# Patient Record
Sex: FEMALE | Race: WHITE | NOT HISPANIC OR LATINO | Employment: OTHER | URBAN - METROPOLITAN AREA
[De-identification: names, ages, dates, MRNs, and addresses within clinical notes are randomized per-mention and may not be internally consistent; named-entity substitution may affect disease eponyms.]

---

## 2019-09-30 ENCOUNTER — HOSPITAL ENCOUNTER (OUTPATIENT)
Dept: BONE DENSITY | Facility: CLINIC | Age: 74
Discharge: HOME | End: 2019-09-30
Attending: FAMILY MEDICINE | Admitting: FAMILY MEDICINE
Payer: COMMERCIAL

## 2019-09-30 DIAGNOSIS — M85.80 OSTEOPENIA: ICD-10-CM

## 2019-09-30 PROCEDURE — 77080 DXA BONE DENSITY AXIAL: CPT

## 2020-10-09 ENCOUNTER — ANESTHESIA (OUTPATIENT)
Dept: SURGERY | Facility: CLINIC | Age: 75
DRG: 342 | End: 2020-10-09
Payer: COMMERCIAL

## 2020-10-09 ENCOUNTER — ANESTHESIA EVENT (OUTPATIENT)
Dept: SURGERY | Facility: CLINIC | Age: 75
DRG: 342 | End: 2020-10-09
Payer: COMMERCIAL

## 2020-10-09 ENCOUNTER — SURGERY (OUTPATIENT)
Age: 75
End: 2020-10-09
Payer: COMMERCIAL

## 2020-10-09 ENCOUNTER — APPOINTMENT (OUTPATIENT)
Dept: CT IMAGING | Facility: CLINIC | Age: 75
DRG: 342 | End: 2020-10-09
Attending: PHYSICIAN ASSISTANT
Payer: COMMERCIAL

## 2020-10-09 ENCOUNTER — HOSPITAL ENCOUNTER (INPATIENT)
Facility: CLINIC | Age: 75
LOS: 2 days | Discharge: HOME | DRG: 342 | End: 2020-10-14
Attending: PHYSICIAN ASSISTANT | Admitting: SURGERY
Payer: COMMERCIAL

## 2020-10-09 DIAGNOSIS — G89.18 ACUTE POST-OPERATIVE PAIN: Primary | ICD-10-CM

## 2020-10-09 DIAGNOSIS — R91.8 PULMONARY NODULES: ICD-10-CM

## 2020-10-09 DIAGNOSIS — K35.201 ACUTE APPENDICITIS WITH PERFORATION, GENERALIZED PERITONITIS, AND GANGRENE, WITHOUT ABSCESS: ICD-10-CM

## 2020-10-09 DIAGNOSIS — K80.20 CHOLELITHIASIS: ICD-10-CM

## 2020-10-09 DIAGNOSIS — K35.30 ACUTE APPENDICITIS WITH LOCALIZED PERITONITIS: ICD-10-CM

## 2020-10-09 PROBLEM — K35.209 ACUTE APPENDICITIS WITH GENERALIZED PERITONITIS: Status: ACTIVE | Noted: 2020-10-09

## 2020-10-09 LAB
ALBUMIN SERPL-MCNC: 3.9 G/DL (ref 3.4–5)
ALP SERPL-CCNC: 86 U/L (ref 40–150)
ALT SERPL W P-5'-P-CCNC: 25 U/L (ref 0–50)
ANION GAP SERPL CALCULATED.3IONS-SCNC: 5 MMOL/L (ref 3–14)
AST SERPL W P-5'-P-CCNC: 17 U/L (ref 0–45)
BASOPHILS # BLD AUTO: 0 10E9/L (ref 0–0.2)
BASOPHILS NFR BLD AUTO: 0.2 %
BILIRUB SERPL-MCNC: 0.8 MG/DL (ref 0.2–1.3)
BUN SERPL-MCNC: 17 MG/DL (ref 7–30)
CALCIUM SERPL-MCNC: 9 MG/DL (ref 8.5–10.1)
CHLORIDE SERPL-SCNC: 104 MMOL/L (ref 94–109)
CO2 SERPL-SCNC: 26 MMOL/L (ref 20–32)
CREAT BLD-MCNC: 0.7 MG/DL (ref 0.52–1.04)
CREAT SERPL-MCNC: 0.7 MG/DL (ref 0.52–1.04)
DIFFERENTIAL METHOD BLD: ABNORMAL
EOSINOPHIL # BLD AUTO: 0 10E9/L (ref 0–0.7)
EOSINOPHIL NFR BLD AUTO: 0.2 %
ERYTHROCYTE [DISTWIDTH] IN BLOOD BY AUTOMATED COUNT: 12.8 % (ref 10–15)
GFR SERPL CREATININE-BSD FRML MDRD: 82 ML/MIN/{1.73_M2}
GFR SERPL CREATININE-BSD FRML MDRD: 85 ML/MIN/{1.73_M2}
GLUCOSE SERPL-MCNC: 144 MG/DL (ref 70–99)
HCT VFR BLD AUTO: 44.5 % (ref 35–47)
HGB BLD-MCNC: 15.2 G/DL (ref 11.7–15.7)
IMM GRANULOCYTES # BLD: 0 10E9/L (ref 0–0.4)
IMM GRANULOCYTES NFR BLD: 0.2 %
LABORATORY COMMENT REPORT: NORMAL
LYMPHOCYTES # BLD AUTO: 1.2 10E9/L (ref 0.8–5.3)
LYMPHOCYTES NFR BLD AUTO: 9.1 %
MCH RBC QN AUTO: 30.8 PG (ref 26.5–33)
MCHC RBC AUTO-ENTMCNC: 34.2 G/DL (ref 31.5–36.5)
MCV RBC AUTO: 90 FL (ref 78–100)
MONOCYTES # BLD AUTO: 0.8 10E9/L (ref 0–1.3)
MONOCYTES NFR BLD AUTO: 5.8 %
NEUTROPHILS # BLD AUTO: 11.3 10E9/L (ref 1.6–8.3)
NEUTROPHILS NFR BLD AUTO: 84.5 %
NRBC # BLD AUTO: 0 10*3/UL
NRBC BLD AUTO-RTO: 0 /100
PLATELET # BLD AUTO: 246 10E9/L (ref 150–450)
POTASSIUM SERPL-SCNC: 3.9 MMOL/L (ref 3.4–5.3)
PROT SERPL-MCNC: 7.5 G/DL (ref 6.8–8.8)
RADIOLOGIST FLAGS: ABNORMAL
RBC # BLD AUTO: 4.94 10E12/L (ref 3.8–5.2)
SARS-COV-2 RNA SPEC QL NAA+PROBE: NEGATIVE
SARS-COV-2 RNA SPEC QL NAA+PROBE: NORMAL
SODIUM SERPL-SCNC: 135 MMOL/L (ref 133–144)
SPECIMEN SOURCE: NORMAL
SPECIMEN SOURCE: NORMAL
WBC # BLD AUTO: 13.4 10E9/L (ref 4–11)

## 2020-10-09 PROCEDURE — 250N000011 HC RX IP 250 OP 636: Performed by: NURSE ANESTHETIST, CERTIFIED REGISTERED

## 2020-10-09 PROCEDURE — 999N000139 HC STATISTIC PRE-PROCEDURE ASSESSMENT II: Performed by: SURGERY

## 2020-10-09 PROCEDURE — 761N000002 HC RECOVERY PHASE 1 LEVEL 1 EA ADDTL HR: Performed by: SURGERY

## 2020-10-09 PROCEDURE — 761N000001 HC RECOVERY PHASE 1 LEVEL 1 FIRST HR: Performed by: SURGERY

## 2020-10-09 PROCEDURE — 44970 LAPAROSCOPY APPENDECTOMY: CPT | Performed by: SURGERY

## 2020-10-09 PROCEDURE — 99222 1ST HOSP IP/OBS MODERATE 55: CPT | Mod: 57 | Performed by: SURGERY

## 2020-10-09 PROCEDURE — 258N000003 HC RX IP 258 OP 636: Performed by: NURSE ANESTHETIST, CERTIFIED REGISTERED

## 2020-10-09 PROCEDURE — U0003 INFECTIOUS AGENT DETECTION BY NUCLEIC ACID (DNA OR RNA); SEVERE ACUTE RESPIRATORY SYNDROME CORONAVIRUS 2 (SARS-COV-2) (CORONAVIRUS DISEASE [COVID-19]), AMPLIFIED PROBE TECHNIQUE, MAKING USE OF HIGH THROUGHPUT TECHNOLOGIES AS DESCRIBED BY CMS-2020-01-R: HCPCS | Performed by: PHYSICIAN ASSISTANT

## 2020-10-09 PROCEDURE — 0DTJ4ZZ RESECTION OF APPENDIX, PERCUTANEOUS ENDOSCOPIC APPROACH: ICD-10-PCS | Performed by: SURGERY

## 2020-10-09 PROCEDURE — 85025 COMPLETE CBC W/AUTO DIFF WBC: CPT | Performed by: PHYSICIAN ASSISTANT

## 2020-10-09 PROCEDURE — 370N000001 HC ANESTHESIA TECHNICAL FEE, 1ST 30 MIN: Performed by: SURGERY

## 2020-10-09 PROCEDURE — 250N000009 HC RX 250: Performed by: NURSE ANESTHETIST, CERTIFIED REGISTERED

## 2020-10-09 PROCEDURE — G0378 HOSPITAL OBSERVATION PER HR: HCPCS

## 2020-10-09 PROCEDURE — 250N000013 HC RX MED GY IP 250 OP 250 PS 637: Performed by: PHYSICIAN ASSISTANT

## 2020-10-09 PROCEDURE — 360N000021 HC SURGERY LEVEL 3 EA 15 ADDTL MIN: Performed by: SURGERY

## 2020-10-09 PROCEDURE — 96376 TX/PRO/DX INJ SAME DRUG ADON: CPT

## 2020-10-09 PROCEDURE — 82565 ASSAY OF CREATININE: CPT

## 2020-10-09 PROCEDURE — 250N000011 HC RX IP 250 OP 636: Performed by: PHYSICIAN ASSISTANT

## 2020-10-09 PROCEDURE — 74177 CT ABD & PELVIS W/CONTRAST: CPT

## 2020-10-09 PROCEDURE — 88304 TISSUE EXAM BY PATHOLOGIST: CPT | Mod: TC | Performed by: SURGERY

## 2020-10-09 PROCEDURE — 80053 COMPREHEN METABOLIC PANEL: CPT | Performed by: PHYSICIAN ASSISTANT

## 2020-10-09 PROCEDURE — 99285 EMERGENCY DEPT VISIT HI MDM: CPT | Mod: 25

## 2020-10-09 PROCEDURE — 96375 TX/PRO/DX INJ NEW DRUG ADDON: CPT

## 2020-10-09 PROCEDURE — 44970 LAPAROSCOPY APPENDECTOMY: CPT | Mod: AS | Performed by: PHYSICIAN ASSISTANT

## 2020-10-09 PROCEDURE — 250N000011 HC RX IP 250 OP 636

## 2020-10-09 PROCEDURE — C9803 HOPD COVID-19 SPEC COLLECT: HCPCS

## 2020-10-09 PROCEDURE — 360N000020 HC SURGERY LEVEL 3 1ST 30 MIN: Performed by: SURGERY

## 2020-10-09 PROCEDURE — 250N000009 HC RX 250: Performed by: SURGERY

## 2020-10-09 PROCEDURE — 96361 HYDRATE IV INFUSION ADD-ON: CPT

## 2020-10-09 PROCEDURE — 96365 THER/PROPH/DIAG IV INF INIT: CPT

## 2020-10-09 PROCEDURE — 272N000001 HC OR GENERAL SUPPLY STERILE: Performed by: SURGERY

## 2020-10-09 PROCEDURE — 250N000009 HC RX 250: Performed by: PHYSICIAN ASSISTANT

## 2020-10-09 PROCEDURE — 250N000003 HC SEVOFLURANE, EA 15 MIN: Performed by: SURGERY

## 2020-10-09 PROCEDURE — 88304 TISSUE EXAM BY PATHOLOGIST: CPT | Mod: 26 | Performed by: PATHOLOGY

## 2020-10-09 PROCEDURE — 258N000003 HC RX IP 258 OP 636: Performed by: PHYSICIAN ASSISTANT

## 2020-10-09 PROCEDURE — 370N000002 HC ANESTHESIA TECHNICAL FEE, EACH ADDTL 15 MIN: Performed by: SURGERY

## 2020-10-09 RX ORDER — FENTANYL CITRATE 50 UG/ML
INJECTION, SOLUTION INTRAMUSCULAR; INTRAVENOUS PRN
Status: DISCONTINUED | OUTPATIENT
Start: 2020-10-09 | End: 2020-10-09

## 2020-10-09 RX ORDER — ONDANSETRON 2 MG/ML
4 INJECTION INTRAMUSCULAR; INTRAVENOUS ONCE
Status: COMPLETED | OUTPATIENT
Start: 2020-10-09 | End: 2020-10-09

## 2020-10-09 RX ORDER — HYDROMORPHONE HYDROCHLORIDE 1 MG/ML
.3-.5 INJECTION, SOLUTION INTRAMUSCULAR; INTRAVENOUS; SUBCUTANEOUS EVERY 5 MIN PRN
Status: DISCONTINUED | OUTPATIENT
Start: 2020-10-09 | End: 2020-10-09

## 2020-10-09 RX ORDER — ALBUTEROL SULFATE 0.83 MG/ML
2.5 SOLUTION RESPIRATORY (INHALATION) EVERY 4 HOURS PRN
Status: DISCONTINUED | OUTPATIENT
Start: 2020-10-09 | End: 2020-10-09

## 2020-10-09 RX ORDER — ONDANSETRON 4 MG/1
4 TABLET, ORALLY DISINTEGRATING ORAL EVERY 30 MIN PRN
Status: DISCONTINUED | OUTPATIENT
Start: 2020-10-09 | End: 2020-10-09

## 2020-10-09 RX ORDER — HYDROMORPHONE HYDROCHLORIDE 1 MG/ML
0.5 INJECTION, SOLUTION INTRAMUSCULAR; INTRAVENOUS; SUBCUTANEOUS ONCE
Status: COMPLETED | OUTPATIENT
Start: 2020-10-09 | End: 2020-10-09

## 2020-10-09 RX ORDER — CEFOTETAN DISODIUM 2 G/20ML
2 INJECTION, POWDER, FOR SOLUTION INTRAMUSCULAR; INTRAVENOUS ONCE
Status: COMPLETED | OUTPATIENT
Start: 2020-10-09 | End: 2020-10-09

## 2020-10-09 RX ORDER — BUPIVACAINE HYDROCHLORIDE AND EPINEPHRINE 5; 5 MG/ML; UG/ML
INJECTION, SOLUTION PERINEURAL PRN
Status: DISCONTINUED | OUTPATIENT
Start: 2020-10-09 | End: 2020-10-09 | Stop reason: HOSPADM

## 2020-10-09 RX ORDER — SODIUM CHLORIDE, SODIUM LACTATE, POTASSIUM CHLORIDE, CALCIUM CHLORIDE 600; 310; 30; 20 MG/100ML; MG/100ML; MG/100ML; MG/100ML
INJECTION, SOLUTION INTRAVENOUS CONTINUOUS PRN
Status: DISCONTINUED | OUTPATIENT
Start: 2020-10-09 | End: 2020-10-09

## 2020-10-09 RX ORDER — FENTANYL CITRATE 50 UG/ML
25-50 INJECTION, SOLUTION INTRAMUSCULAR; INTRAVENOUS EVERY 5 MIN PRN
Status: DISCONTINUED | OUTPATIENT
Start: 2020-10-09 | End: 2020-10-09

## 2020-10-09 RX ORDER — ONDANSETRON 2 MG/ML
INJECTION INTRAMUSCULAR; INTRAVENOUS
Status: COMPLETED
Start: 2020-10-09 | End: 2020-10-09

## 2020-10-09 RX ORDER — ONDANSETRON 2 MG/ML
INJECTION INTRAMUSCULAR; INTRAVENOUS PRN
Status: DISCONTINUED | OUTPATIENT
Start: 2020-10-09 | End: 2020-10-09

## 2020-10-09 RX ORDER — GLYCOPYRROLATE 0.2 MG/ML
INJECTION, SOLUTION INTRAMUSCULAR; INTRAVENOUS PRN
Status: DISCONTINUED | OUTPATIENT
Start: 2020-10-09 | End: 2020-10-09

## 2020-10-09 RX ORDER — DEXTROSE MONOHYDRATE, SODIUM CHLORIDE, AND POTASSIUM CHLORIDE 50; 1.49; 4.5 G/1000ML; G/1000ML; G/1000ML
INJECTION, SOLUTION INTRAVENOUS CONTINUOUS
Status: DISCONTINUED | OUTPATIENT
Start: 2020-10-09 | End: 2020-10-11

## 2020-10-09 RX ORDER — DEXAMETHASONE SODIUM PHOSPHATE 4 MG/ML
INJECTION, SOLUTION INTRA-ARTICULAR; INTRALESIONAL; INTRAMUSCULAR; INTRAVENOUS; SOFT TISSUE PRN
Status: DISCONTINUED | OUTPATIENT
Start: 2020-10-09 | End: 2020-10-09

## 2020-10-09 RX ORDER — PIPERACILLIN SODIUM, TAZOBACTAM SODIUM 3; .375 G/15ML; G/15ML
3.38 INJECTION, POWDER, LYOPHILIZED, FOR SOLUTION INTRAVENOUS EVERY 6 HOURS
Status: DISCONTINUED | OUTPATIENT
Start: 2020-10-09 | End: 2020-10-14 | Stop reason: HOSPADM

## 2020-10-09 RX ORDER — NEOSTIGMINE METHYLSULFATE 1 MG/ML
VIAL (ML) INJECTION PRN
Status: DISCONTINUED | OUTPATIENT
Start: 2020-10-09 | End: 2020-10-09

## 2020-10-09 RX ORDER — NALOXONE HYDROCHLORIDE 0.4 MG/ML
.1-.4 INJECTION, SOLUTION INTRAMUSCULAR; INTRAVENOUS; SUBCUTANEOUS
Status: DISCONTINUED | OUTPATIENT
Start: 2020-10-09 | End: 2020-10-14 | Stop reason: HOSPADM

## 2020-10-09 RX ORDER — NALOXONE HYDROCHLORIDE 0.4 MG/ML
.1-.4 INJECTION, SOLUTION INTRAMUSCULAR; INTRAVENOUS; SUBCUTANEOUS
Status: DISCONTINUED | OUTPATIENT
Start: 2020-10-09 | End: 2020-10-09

## 2020-10-09 RX ORDER — ONDANSETRON 2 MG/ML
4 INJECTION INTRAMUSCULAR; INTRAVENOUS EVERY 6 HOURS PRN
Status: DISCONTINUED | OUTPATIENT
Start: 2020-10-09 | End: 2020-10-14 | Stop reason: HOSPADM

## 2020-10-09 RX ORDER — IOPAMIDOL 755 MG/ML
63 INJECTION, SOLUTION INTRAVASCULAR ONCE
Status: COMPLETED | OUTPATIENT
Start: 2020-10-09 | End: 2020-10-09

## 2020-10-09 RX ORDER — ONDANSETRON 2 MG/ML
4 INJECTION INTRAMUSCULAR; INTRAVENOUS EVERY 30 MIN PRN
Status: DISCONTINUED | OUTPATIENT
Start: 2020-10-09 | End: 2020-10-09

## 2020-10-09 RX ORDER — DIPHENHYDRAMINE HYDROCHLORIDE 50 MG/ML
25 INJECTION INTRAMUSCULAR; INTRAVENOUS EVERY 6 HOURS PRN
Status: DISCONTINUED | OUTPATIENT
Start: 2020-10-09 | End: 2020-10-14 | Stop reason: HOSPADM

## 2020-10-09 RX ORDER — PROCHLORPERAZINE MALEATE 5 MG
5 TABLET ORAL EVERY 6 HOURS PRN
Status: DISCONTINUED | OUTPATIENT
Start: 2020-10-09 | End: 2020-10-14 | Stop reason: HOSPADM

## 2020-10-09 RX ORDER — LIDOCAINE HYDROCHLORIDE 20 MG/ML
INJECTION, SOLUTION INFILTRATION; PERINEURAL PRN
Status: DISCONTINUED | OUTPATIENT
Start: 2020-10-09 | End: 2020-10-09

## 2020-10-09 RX ORDER — OXYCODONE HYDROCHLORIDE 5 MG/1
5-10 TABLET ORAL
Status: DISCONTINUED | OUTPATIENT
Start: 2020-10-09 | End: 2020-10-14 | Stop reason: HOSPADM

## 2020-10-09 RX ORDER — MEPERIDINE HYDROCHLORIDE 25 MG/ML
12.5 INJECTION INTRAMUSCULAR; INTRAVENOUS; SUBCUTANEOUS EVERY 5 MIN PRN
Status: DISCONTINUED | OUTPATIENT
Start: 2020-10-09 | End: 2020-10-09

## 2020-10-09 RX ORDER — PROPOFOL 10 MG/ML
INJECTION, EMULSION INTRAVENOUS
Status: DISCONTINUED | OUTPATIENT
Start: 2020-10-09 | End: 2020-10-09

## 2020-10-09 RX ORDER — ONDANSETRON 4 MG/1
4 TABLET, ORALLY DISINTEGRATING ORAL EVERY 6 HOURS PRN
Status: DISCONTINUED | OUTPATIENT
Start: 2020-10-09 | End: 2020-10-14 | Stop reason: HOSPADM

## 2020-10-09 RX ORDER — EPHEDRINE SULFATE 50 MG/ML
INJECTION, SOLUTION INTRAMUSCULAR; INTRAVENOUS; SUBCUTANEOUS PRN
Status: DISCONTINUED | OUTPATIENT
Start: 2020-10-09 | End: 2020-10-09

## 2020-10-09 RX ORDER — AMOXICILLIN 250 MG
1 CAPSULE ORAL 2 TIMES DAILY
Status: DISCONTINUED | OUTPATIENT
Start: 2020-10-09 | End: 2020-10-13

## 2020-10-09 RX ORDER — SODIUM CHLORIDE, SODIUM LACTATE, POTASSIUM CHLORIDE, CALCIUM CHLORIDE 600; 310; 30; 20 MG/100ML; MG/100ML; MG/100ML; MG/100ML
INJECTION, SOLUTION INTRAVENOUS CONTINUOUS
Status: DISCONTINUED | OUTPATIENT
Start: 2020-10-09 | End: 2020-10-09

## 2020-10-09 RX ORDER — ACETAMINOPHEN 325 MG/1
975 TABLET ORAL EVERY 6 HOURS
Status: DISCONTINUED | OUTPATIENT
Start: 2020-10-09 | End: 2020-10-14 | Stop reason: HOSPADM

## 2020-10-09 RX ORDER — PROPOFOL 10 MG/ML
INJECTION, EMULSION INTRAVENOUS PRN
Status: DISCONTINUED | OUTPATIENT
Start: 2020-10-09 | End: 2020-10-09

## 2020-10-09 RX ORDER — HYDROMORPHONE HYDROCHLORIDE 1 MG/ML
.3-.5 INJECTION, SOLUTION INTRAMUSCULAR; INTRAVENOUS; SUBCUTANEOUS
Status: DISCONTINUED | OUTPATIENT
Start: 2020-10-09 | End: 2020-10-14 | Stop reason: HOSPADM

## 2020-10-09 RX ADMIN — CEFOTETAN DISODIUM 2 G: 2 INJECTION, POWDER, FOR SOLUTION INTRAMUSCULAR; INTRAVENOUS at 13:22

## 2020-10-09 RX ADMIN — ONDANSETRON 4 MG: 2 INJECTION INTRAMUSCULAR; INTRAVENOUS at 12:02

## 2020-10-09 RX ADMIN — PHENYLEPHRINE HYDROCHLORIDE 100 MCG: 10 INJECTION INTRAVENOUS at 14:52

## 2020-10-09 RX ADMIN — FENTANYL CITRATE 50 MCG: 50 INJECTION, SOLUTION INTRAMUSCULAR; INTRAVENOUS at 14:26

## 2020-10-09 RX ADMIN — GLYCOPYRROLATE 0.4 MG: 0.2 INJECTION, SOLUTION INTRAMUSCULAR; INTRAVENOUS at 15:33

## 2020-10-09 RX ADMIN — PHENYLEPHRINE HYDROCHLORIDE 100 MCG: 10 INJECTION INTRAVENOUS at 14:55

## 2020-10-09 RX ADMIN — BUPIVACAINE HYDROCHLORIDE AND EPINEPHRINE BITARTRATE 30 ML: 5; .005 INJECTION, SOLUTION PERINEURAL at 15:28

## 2020-10-09 RX ADMIN — Medication 10 MG: at 14:32

## 2020-10-09 RX ADMIN — PROPOFOL 50 MG: 10 INJECTION, EMULSION INTRAVENOUS at 15:16

## 2020-10-09 RX ADMIN — HYDROMORPHONE HYDROCHLORIDE 0.5 MG: 1 INJECTION, SOLUTION INTRAMUSCULAR; INTRAVENOUS; SUBCUTANEOUS at 13:22

## 2020-10-09 RX ADMIN — SODIUM CHLORIDE 60 ML: 9 INJECTION, SOLUTION INTRAVENOUS at 12:17

## 2020-10-09 RX ADMIN — SUGAMMADEX 200 MG: 100 INJECTION, SOLUTION INTRAVENOUS at 15:49

## 2020-10-09 RX ADMIN — ONDANSETRON 4 MG: 2 INJECTION INTRAMUSCULAR; INTRAVENOUS at 15:08

## 2020-10-09 RX ADMIN — Medication 5 MG: at 14:38

## 2020-10-09 RX ADMIN — PROPOFOL 150 MG: 10 INJECTION, EMULSION INTRAVENOUS at 14:26

## 2020-10-09 RX ADMIN — HYDROMORPHONE HYDROCHLORIDE 0.5 MG: 1 INJECTION, SOLUTION INTRAMUSCULAR; INTRAVENOUS; SUBCUTANEOUS at 12:04

## 2020-10-09 RX ADMIN — ROCURONIUM BROMIDE 35 MG: 10 INJECTION INTRAVENOUS at 14:26

## 2020-10-09 RX ADMIN — FENTANYL CITRATE 50 MCG: 50 INJECTION, SOLUTION INTRAMUSCULAR; INTRAVENOUS at 15:02

## 2020-10-09 RX ADMIN — NEOSTIGMINE METHYLSULFATE 3 MG: 1 INJECTION, SOLUTION INTRAVENOUS at 15:33

## 2020-10-09 RX ADMIN — PHENYLEPHRINE HYDROCHLORIDE 100 MCG: 10 INJECTION INTRAVENOUS at 14:44

## 2020-10-09 RX ADMIN — HYDROMORPHONE HYDROCHLORIDE 0.5 MG: 1 INJECTION, SOLUTION INTRAMUSCULAR; INTRAVENOUS; SUBCUTANEOUS at 15:17

## 2020-10-09 RX ADMIN — LIDOCAINE HYDROCHLORIDE 60 MG: 20 INJECTION, SOLUTION INFILTRATION; PERINEURAL at 14:26

## 2020-10-09 RX ADMIN — SODIUM CHLORIDE, POTASSIUM CHLORIDE, SODIUM LACTATE AND CALCIUM CHLORIDE: 600; 310; 30; 20 INJECTION, SOLUTION INTRAVENOUS at 15:30

## 2020-10-09 RX ADMIN — PIPERACILLIN SODIUM AND TAZOBACTAM SODIUM 3.38 G: 3; .375 INJECTION, POWDER, LYOPHILIZED, FOR SOLUTION INTRAVENOUS at 20:19

## 2020-10-09 RX ADMIN — PHENYLEPHRINE HYDROCHLORIDE 100 MCG: 10 INJECTION INTRAVENOUS at 14:50

## 2020-10-09 RX ADMIN — DEXAMETHASONE SODIUM PHOSPHATE 4 MG: 4 INJECTION, SOLUTION INTRA-ARTICULAR; INTRALESIONAL; INTRAMUSCULAR; INTRAVENOUS; SOFT TISSUE at 14:28

## 2020-10-09 RX ADMIN — IOPAMIDOL 63 ML: 755 INJECTION, SOLUTION INTRAVENOUS at 12:17

## 2020-10-09 RX ADMIN — SODIUM CHLORIDE, POTASSIUM CHLORIDE, SODIUM LACTATE AND CALCIUM CHLORIDE: 600; 310; 30; 20 INJECTION, SOLUTION INTRAVENOUS at 14:21

## 2020-10-09 RX ADMIN — DOCUSATE SODIUM 50 MG AND SENNOSIDES 8.6 MG 1 TABLET: 8.6; 5 TABLET, FILM COATED ORAL at 20:17

## 2020-10-09 RX ADMIN — ROCURONIUM BROMIDE 15 MG: 10 INJECTION INTRAVENOUS at 14:38

## 2020-10-09 RX ADMIN — Medication 10 MG: at 14:35

## 2020-10-09 RX ADMIN — ACETAMINOPHEN 975 MG: 325 TABLET, FILM COATED ORAL at 20:17

## 2020-10-09 RX ADMIN — SODIUM CHLORIDE 1000 ML: 9 INJECTION, SOLUTION INTRAVENOUS at 12:02

## 2020-10-09 ASSESSMENT — MIFFLIN-ST. JEOR: SCORE: 1041.13

## 2020-10-09 ASSESSMENT — ENCOUNTER SYMPTOMS
NAUSEA: 1
CONSTIPATION: 0
VOMITING: 0
ABDOMINAL PAIN: 1
BLOOD IN STOOL: 0
DYSURIA: 0
DIARRHEA: 0

## 2020-10-09 NOTE — ED PROVIDER NOTES
"  History   Chief Complaint:  Abdominal Pain    HPI  Winston Mcleod is a 75 year old female with a history of breast cancer and GERD who presents for evaluation of abdominal pain. She reports the onset of this pain last night, and states it was generalized at the time but has since moved to the [facility]. She also notes feeling bloated, and states she did not sleep last night due to pain. She denies previous abdominal surgery, and denies vomiting, or diarrhea but has felt nauseaus. She states her last bowel movement was yesterday and was normal. She also denies dysuria.     Allergies:  Atorvastatin    Medications:    Zovirax  Xanax  Flexeril  Decadron  Neurontin  Prilosec  Inderal  Sonata    Past Medical History:    TMJ  Hypertension  Herpes  Breast cancer  GERD    Past Surgical History:    Breast biopsy     Family History:    No past pertinent family history.    Social History:  Marital Status:   Presents with  at bedside  Tobacco use: Never  Alcohol use: Yes  Drug use: No    Review of Systems   Gastrointestinal: Positive for abdominal pain (and bloating) and nausea. Negative for blood in stool, constipation, diarrhea and vomiting.   Genitourinary: Negative for dysuria.   All other systems reviewed and are negative.    Physical Exam     Patient Vitals for the past 24 hrs:   BP Temp Temp src Pulse Resp SpO2 Height Weight   10/09/20 1121 (!) 180/87 98  F (36.7  C) Temporal 75 16 98 % 1.6 m (5' 3\") 56.7 kg (125 lb)       Physical Exam  General: Alert and cooperative with exam. Resting comfortably on gurney  Head:  Scalp is NC/AT  Eyes:  Conjunctiva normal, PERRL  ENT:  The external nose and ears are normal.   Neck:  Normal range of motion without rigidity.  CV:  Regular rate and rhythm    No pathologic murmur, rubs, or gallops.  Resp:  Breath sounds are clear bilaterally.  No crackles, wheezes, rhonchi, stridor.    Non-labored, no retractions or accessory muscle use  Abdomen: Abdomen is soft, no distension, " Moderate RLQ tenderness, negative Murray sign, no masses. No peritoneal signs. No CVA tenderness.  MS:  No lower extremity edema or asymmetric calf swelling. Normal ROM in all joints without effusions.    No midline cervical, thoracic, or lumbar tenderness  Skin:  Warm and dry, No rash or lesions noted. 2+ peripheral pulses in all extremities  Neuro: Alert and oriented x3.  No gross motor deficits.  No facial asymmetry.  Psych: Awake. Alert. Normal affect. Appropriate interactions.    Emergency Department Course     Imaging:  Radiology findings were communicated with the patient who voiced understanding of the findings.    CT Abdomen Pelvis w/ IV contrast:   1.  Appendicitis.   2.  Cholelithiasis.   3.  There are two small lung nodules, as per radiology.    Laboratory:  Laboratory findings were communicated with the patient who voiced understanding of the findings.    CBC: WBC: 13.4 (H), HGB: 15.2, PLT: 246    CMP: Glucose 144 (H), o/w WNL (Creatinine: 0.70)    Creatinine POCT: WNL    UA with micro: pending     Asymptomatic COVID PCR: pending    Interventions:  1202: Zofran, 4 mg, IV  1202: NS, 1 L, IV  1204: Dilaudid, 0.5 mg, IV  1322: Dilaudid, 0.5 mg, IV  1322: Cefotan, 2 g, IV    Emergency Department Course:  Past medical records, nursing notes, and vitals reviewed.    1130: I performed an exam of the patient as documented above.     IV was inserted and blood was drawn for laboratory testing, results above.  The patient provided a urine sample here in the emergency department. This was sent for laboratory testing, findings above.  The patient was sent for a CT while in the emergency department, results above.     1253:   I spoke with Radiology regarding patient's CT findings.    1255: I rechecked the patient and discussed the results of her workup thus far.     1303:   I spoke with Dr. Garcia of the general surgery service regarding patient's presentation, findings, and plan of care.    Findings and plan  explained to the Patient. Patient will be transferred to OR for surgery. Discussed the case with Dr. Garcia, who will accept the patient to the OR.     I personally reviewed the laboratory and imaging results with the Patient and answered all related questions prior to transfer to OR.    Impression & Plan     Covid-19  Winston Mcleod was evaluated during a global COVID-19 pandemic, which necessitated consideration that the patient might be at risk for infection with the SARS-CoV-2 virus that causes COVID-19.   Applicable protocols for evaluation were followed during the patient's care.   COVID-19 was considered as part of the patient's evaluation. The plan for testing is:  a test was obtained during this visit.    Medical Decision Making:  Winston Mcleod is a 75 year old female who presents with abdominal pain concerning for appendicitis. CT scan confirms the presence of appendicitis, and there is no evidence of rupture or abscess at this time. The patient s symptoms have been controlled with interventions in the Emergency Department, and she appears more comfortable on recheck. I discussed the diagnosis with the patient and family and have answered all questions about the plan of care. Parenteral antibiotics have been ordered .  I discussed the patient with the on call general surgeon, Dr. Garcia, and the patient will be admitted for surgery. she has remained hemodynamically stable in the Emergency Department.  Discussed need to follow-up with pcp regarding incidental findings of cholelithiasis and pulmonary nodules to arrange for further evaluation and imaging.      Diagnosis:    ICD-10-CM    1. Acute appendicitis with localized peritonitis  K35.30    2. Cholelithiasis  K80.20    3. Pulmonary nodules  R91.8        Disposition:  Sent to OR.    Scribe Disclosure:  Ary PALACIO, am serving as a scribe at 11:28 AM on 10/9/2020 to document services personally performed by Tariq Conklin PA based on my  observations and the provider's statements to me.      Tariq Conklin PA-C  10/09/20 [id number]

## 2020-10-09 NOTE — H&P
General Surgery Consultation    Winston Mcleod MRN#: [id number]   Age: 75 year old YOB: 1945     Referring provider: Tariq Conklin  Date of Admission:          10/9/2020  Reason for H&P: Surgery   Surgeon:      Dr. Garcia            Chief Complaint:     Chief Complaint   Patient presents with     Abdominal Pain          History of Present Illness:   This patient is a 75 year old female who presented to the [facility] ER with generalized abdominal pain, now localized to the RLQ with associated bloating and nausea since light night.  She denies fever, chills, vomiting, change in BM or urination.  She denies having any previous episodes or abdominal surgeries.  The patient's co-morbidities include hypertension.  The history is obtained from the patient and chart. The patient is NPO.    COVID result: collected          Past Medical History:   TMJ  Hypertension  Herpes  Breast cancer  GERD  Cholelithiasis - dx 15 years ago         Past Surgical History:   Breast biopsy         Medications:     Prior to Admission medications    Medication Sig Start Date End Date Taking? Authorizing Provider   diazepam (VALIUM) 5 MG tablet Take 1 tablet (5 mg) by mouth every 6 hours as needed for muscle spasms or pain 6/14/16   Mac Javier MD   HYDROcodone-acetaminophen (NORCO) 5-325 MG per tablet Take 1-2 tablets by mouth every 4 hours as needed 6/14/16   Mac Javier MD   NO ACTIVE MEDICATIONS     Reported, Patient   oxycodone-acetaminophen (PERCOCET) 5-325 MG per tablet Take 1 tablet by mouth every 4 hours as needed for pain. 10/27/11   King Cain MD          Current Medications:           cefoTEtan  2 g Intravenous Once     HYDROmorphone  0.5 mg Intravenous Once          Allergies:   No Known Allergies          Social History:     Social History     Tobacco Use     Smoking status: Not on file   Substance Use Topics     Alcohol use: No          Family History:   Denies any  "pertinent family history.         Review of Systems:   The 12 point Review of Systems is negative other than noted in the HPI.         Physical Exam:   Blood pressure (!) 180/87, pulse 75, temperature 98  F (36.7  C), temperature source Temporal, resp. rate 16, height 1.6 m (5' 3\"), weight 56.7 kg (125 lb), SpO2 98 %.  No intake/output data recorded.  General - This is an elderly female in no apparent distress.  HEENT - Normocephalic. Atraumatic. Moist mucous membranes. Pupils equal.  No scleral icterus.  Neck - Supple without masses or lymphadenopathy.  Lungs - Clear to ascultation bilaterally without crackles or wheezing.    Heart - Regular rate & rhythm without murmur.  Abdomen - Soft, ttp RLQ, non-distended, +bowel sounds, no masses or organomegaly.  Extremities - Moves all extremities. No edema.  Neurologic - Nonfocal.  Skin - Warm and dry.          Data:   Labs:  Recent Labs   Lab Test 10/09/20  1148   WBC 13.4*   HGB 15.2   HCT 44.5        Recent Labs   Lab Test 10/09/20  1148   POTASSIUM 3.9   CHLORIDE 104   CO2 26   BUN 17   CR 0.70     Recent Labs   Lab Test 10/09/20  1148   BILITOTAL 0.8   ALT 25   AST 17   ALKPHOS 86     CT scan of the abdomen: 1. Dilated fluid-filled appendix which contains appendicoliths.  The maximum thickness is 17 mm. There is hazy adjacent inflammatory change. Cholelithiasis. 3 mm right lung nodule on image 2. 4 mm right lung nodule. These findings were discussed with the patient.         Assessment:   Acute appendicitis         Plan:   - Plan for lap appy.  Procedure, risks, and recovery period briefly discussed with patient. Surgeon to discuss further.  Patient agrees with plan.  - Pre op orders in chart  - NPO, IVF, IV pain control, Cefotetan  - Pepcid  - Follow up with PCP for incidental CT scan findings of pulmonary nodules.    I have discussed the history, physical, and plan with Dr. Garcia and who has independently interviewed and examined the patient and agrees with " the plan as stated.     Lubna Treadwell PA-C  [facility]  [phone]

## 2020-10-09 NOTE — ED PROVIDER NOTES
Emergency Department Attending Supervision Note  10/9/2020  1:43 PM      I evaluated this patient in conjunction with Tariq Conklin PA-C.      Briefly, the patient presented with generalized abdominal pain that developed last night that has since localized to her right lower quadrant today. She also notes some abdominal distention. Patient denies any bowel symptoms, urinary symptoms, nausea, vomiting, or history of abdominal surgeries.     On my exam,   Vitals: reviewed by me  General: Pt seen on hospital [facility], pleasant, cooperative, and alert to conversation  Eyes: Tracking well, clear conjunctiva BL  ENT: MMM, midline trachea.   Lungs:   No tachypnea, no accessory muscle use. No respiratory distress.   CV: Rate as above, regular rhythm.    Abd: Soft, right lower quadrant tenderness noted, some guarding, no rebound.  MSK: no peripheral edema or joint effusion.  No evidence of trauma  Skin: No rash, normal turgor and temperature  Neuro: Clear speech and no facial droop.  Psych: Not RIS, no e/o AH/VH    Results:  CT Abdomen Pelvis w/ IV contrast:   1.  Appendicitis.   2.  Cholelithiasis.   3.  There are two small lung nodules, as per radiology.     Laboratory:  Laboratory findings were communicated with the patient who voiced understanding of the findings.     CBC: WBC: 13.4 (H), HGB: 15.2, PLT: 246     CMP: Glucose 144 (H), o/w WNL (Creatinine: 0.70)     Creatinine POCT: WNL     UA with micro: Pending     Asymptomatic COVID PCR: pending    Interventions:  1202: Zofran, 4 mg, IV  1202: NS, 1 L, IV  1204: Dilaudid, 0.5 mg, IV  1322: Dilaudid, 0.5 mg, IV  1322: Cefotan, 2 g, IV    ED course:    Nursing notes and vitals reviewed. 1300 I performed an exam of the patient as documented above.     IV inserted. Medicine administered as documented above. Blood drawn. This was sent to the lab for further testing, results above.    The patient was sent for an abdomen/pelvis CT while in the emergency department,  findings above.     Patient will be taken to the OR with Dr. Garcia.     My impression:  Winston Mcleod is a very pleasant 75 year old female who presents today with abdominal pain likely due to appendicitis found on CT scan. Surgery is aware, she will be taken directly to the OR, I am told. Antibiotics are running, patient herself states she is in some pain but does feel much improved. Will plan for disposition as above, otherwise no changes to [facility], medical management.    Diagnosis    ICD-10-CM    1. Acute appendicitis with localized peritonitis  K35.30    2. Cholelithiasis  K80.20    3. Pulmonary nodules  R91.8      Scribe Disclosure:  I, Melinda Licona, am serving as a scribe on 10/9/2020 at 1:40 PM to personally document services performed by Roberto Mercer MD, based on my observations and the provider's statements to me.      Roberto Mercer MD  10/09/20 8499

## 2020-10-09 NOTE — OR NURSING
PNDS criteria met.  Dr Avilez here to assess Mrs. Mcleod; order received to transfer to [facility] for continued recovery.  Hand-off report to RN.  To  326-1 per cart with all belongings.  Will transport with Capnography monitoring.

## 2020-10-09 NOTE — BRIEF OP NOTE
General Surgery Brief Operative Note    Pre-operative diagnosis: Appendicitis [K37]   Post-operative diagnosis Perforated appendicitis   Procedure: Procedure(s):  LAPAROSCOPIC APPENDECTOMY    Surgeon(s), Assistant(s): Surgeon(s) and Role:     * Jayden Garcia MD - Primary     * Lubna Treadwell PA-C   Estimated blood loss: * No values recorded between 10/9/2020  2:56 PM and 10/9/2020  3:44 PM *   Drains: Db-Cabezas   Specimens: ID Type Source Tests Collected by Time Destination   A : appendix Tissue Appendix SURGICAL PATHOLOGY EXAM Jayden Garcia MD 10/9/2020  3:07 PM       Findings: Perforated appendix, generalized peritonitis   Condition: Stable   Comments:      Lubna Treadwell PA-C See dictated operative report for full details

## 2020-10-09 NOTE — ED TRIAGE NOTES
Abd pain started lastnight with pain all over abd, and now has moved somewhat to the RLQ.  Pt reports feeling bloated.  Pt did not sleep all night due to pain.

## 2020-10-09 NOTE — ANESTHESIA PROCEDURE NOTES
Airway   Date/Time: 10/9/2020 2:29 PM   Patient location during procedure: OR    Staff -   Anesthesiologist:  King Block MD  CRNA: Erica Sheldon APRN CRNA  Performed By: CRNA    Consent for Airway   Urgency: emergent    Indications and Patient Condition  Indications for airway management: jennie-procedural  Induction type:intravenousMask difficulty assessment: 1 - vent by mask    Final Airway Details  Final airway type: endotracheal airway  Successful airway:ETT - single  Endotracheal Airway Details   Successful intubation technique: direct laryngoscopy  Grade View of Cords: 1  Adjucts: stylet  Measured from: gums/teeth  Secured at (cm): 21  Secured with: pink tape    Post intubation assessment   Placement verified by: capnometry, equal breath sounds and chest rise   Number of attempts at approach: 1  Secured with:pink tape  Ease of procedure: easy  Dentition: Intact

## 2020-10-09 NOTE — ANESTHESIA PREPROCEDURE EVALUATION
Anesthesia Pre-Procedure Evaluation    Patient: Winston Mcleod   MRN: [id number] : 1945          Preoperative Diagnosis: Appendicitis [K37]    Procedure(s):  LAPAROSCOPIC APPENDECTOMY    No past medical history on file.  No past surgical history on file.  No Known Allergies  Social History     Tobacco Use     Smoking status: Not on file   Substance Use Topics     Alcohol use: No     Prior to Admission medications    Medication Sig Start Date End Date Taking? Authorizing Provider   diazepam (VALIUM) 5 MG tablet Take 1 tablet (5 mg) by mouth every 6 hours as needed for muscle spasms or pain 16   Mac Javier MD   HYDROcodone-acetaminophen (NORCO) 5-325 MG per tablet Take 1-2 tablets by mouth every 4 hours as needed 16   Mac Javier MD   NO ACTIVE MEDICATIONS     Reported, Patient   oxycodone-acetaminophen (PERCOCET) 5-325 MG per tablet Take 1 tablet by mouth every 4 hours as needed for pain. 10/27/11   King Cain MD     Current Facility-Administered Medications Ordered in Epic   Medication Dose Route Frequency Last Rate Last Dose     [Auto Hold] famotidine (PEPCID) injection 20 mg  20 mg Intravenous Q12H         Patient RECEIVING antibiotic to treat a different condition and it provides ADEQUATE COVERAGE for this surgical procedure.  1 each As instructed Continuous         Provider ordered ALTERNATE pre op antibiotic.  1 each As instructed Continuous         No current [facility]-ordered outpatient medications on file.       Patient RECEIVING antibiotic to treat a different condition and it provides ADEQUATE COVERAGE for this surgical procedure.       Another Antibiotic has been ordered.       Recent Labs   Lab Test 10/09/20  1148      POTASSIUM 3.9   CHLORIDE 104   CO2 26   ANIONGAP 5   *   BUN 17   CR 0.70   SHAHAB 9.0     Recent Labs   Lab Test 10/09/20  1148   WBC 13.4*   HGB 15.2        No results for input(s): ABO, RH in the last 91087 hours.  No results for  input(s): TROPI in the last 56376 hours.  No results for input(s): PH, PCO2, PO2, HCO3 in the last 78433 hours.  No results for input(s): HCG in the last 78419 hours.  Recent Results (from the past 744 hour(s))   CT Abdomen Pelvis w Contrast   Result Value    Radiologist flags Appendicitis (STACY)    Narrative    CT ABDOMEN AND PELVIS WITH CONTRAST 10/9/2020 12:37 PM    CLINICAL HISTORY: Right lower quadrant abdominal pain and tenderness.    TECHNIQUE: CT scan of the abdomen and pelvis was performed following  injection of IV contrast. Multiplanar reformats were obtained. Dose  reduction techniques were used.  CONTRAST: 63 mL Isovue-370    COMPARISON: None.    FINDINGS:   LOWER CHEST: 3 mm right lung nodule on image 2. 4 mm right lung nodule  on image 4.    HEPATOBILIARY: Cholelithiasis. There is a single low-attenuation  subcentimeter liver lesion(s) compatible with benign cysts or other  benign lesions. No specific evaluation or follow-up is recommended in  a low risk patient.    PANCREAS: Normal.    SPLEEN: Normal.    ADRENAL GLANDS: Normal.    KIDNEYS/BLADDER: Bilateral renal cysts.    BOWEL: Dilated fluid-filled appendix which contains appendicoliths.  The maximum thickness is 17 mm. There is hazy adjacent inflammatory  change. Mild hiatal hernia.    PELVIC ORGANS: Small amount of free fluid in the lower pelvis.    ADDITIONAL FINDINGS: None.    MUSCULOSKELETAL: Chronic L2 compression deformity. Grade 1  spondylolisthesis at L4-L5.      Impression    IMPRESSION:   1.  Appendicitis.    2.  Cholelithiasis.    3.  There are two small lung nodules.    Recommendations for one or multiple incidental lung nodules < 6mm:    Low risk patients: No routine follow-up.    High risk patients: Optional follow-up CT at 12 months; if  unchanged, no further follow-up.    *Low Risk: Minimal or absent history of smoking or other known risk  factors.  *Nonsolid (ground glass) or partly solid nodules may require longer  follow-up to  exclude indolent adenocarcinoma.  *Recommendations based on Guidelines for the Management of Incidental  Pulmonary Nodules Detected at CT: From the Fleischner Society 2017,  Radiology 2017.      [Critical Result: Appendicitis]    Finding was identified on 10/9/2020 12:48 PM.     Dr. Conklin was contacted by me on 10/9/2020 12:51 PM and verbalized  understanding of the critical result.      No results found for this or any previous visit (from the past 4320 hour(s)).      RECENT LABS:       Anesthesia Evaluation     .             ROS/MED HX    ENT/Pulmonary:     (+), . Other pulmonary disease pulmonary nodules .    Neurologic:       Cardiovascular:     (+) hypertension----. : . . . :. .       METS/Exercise Tolerance:     Hematologic:         Musculoskeletal:         GI/Hepatic:     (+) GERD appendicitis,       Renal/Genitourinary:         Endo:         Psychiatric:         Infectious Disease:         Malignancy:   (+) Malignancy History of Breast          Other:                                 Lab Results   Component Value Date    WBC 13.4 (H) 10/09/2020    HGB 15.2 10/09/2020    HCT 44.5 10/09/2020     10/09/2020     10/09/2020    POTASSIUM 3.9 10/09/2020    CHLORIDE 104 10/09/2020    CO2 26 10/09/2020    BUN 17 10/09/2020    CR 0.70 10/09/2020     (H) 10/09/2020    SHAHAB 9.0 10/09/2020    ALBUMIN 3.9 10/09/2020    PROTTOTAL 7.5 10/09/2020    ALT 25 10/09/2020    AST 17 10/09/2020    ALKPHOS 86 10/09/2020    BILITOTAL 0.8 10/09/2020       Preop Vitals  BP Readings from Last 3 Encounters:   10/09/20 139/81   06/14/16 163/90   10/30/11 145/90    Pulse Readings from Last 3 Encounters:   10/09/20 55   06/14/16 86   10/30/11 79      Resp Readings from Last 3 Encounters:   10/09/20 16   06/14/16 16   10/30/11 16    SpO2 Readings from Last 3 Encounters:   10/09/20 100%   06/14/16 99%   10/30/11 99%      Temp Readings from Last 1 Encounters:   10/09/20 36.7  C (98  F) (Temporal)    Ht Readings from Last 1  "Encounters:   10/09/20 1.6 m (5' 3\")      Wt Readings from Last 1 Encounters:   10/09/20 56.7 kg (125 lb)    Estimated body mass index is 22.14 kg/m  as calculated from the following:    Height as of this encounter: 1.6 m (5' 3\").    Weight as of this encounter: 56.7 kg (125 lb).       Anesthesia Plan      History & Physical Review      ASA Status:  2 .    NPO Status:  > 8 hours    Plan for General (ETT) with Intravenous and Propofol induction. Maintenance will be Balanced.             Postoperative Care  Postoperative pain management:  IV analgesics.      Consents  Anesthetic plan, risks, benefits and alternatives discussed with:  Patient..                 Delbert Hernandez MD  "

## 2020-10-09 NOTE — OR NURSING
Continues to be very sedated.  Awakes to touch, and occasionally opens eyes spontaneously,  but not responding verbally and frequently drifts off to sleep.  Occasionally moves all extremities.  JESÚS.   Dr. Block here to assess Mrs. Mcleod; will continue to monitor.

## 2020-10-09 NOTE — ANESTHESIA CARE TRANSFER NOTE
Patient: Winston Mcleod    Procedure(s):  LAPAROSCOPIC APPENDECTOMY    Diagnosis: Appendicitis [K37]  Diagnosis Additional Information: No value filed.    Anesthesia Type:   General     Note:  Airway :Nasal Cannula  Patient transferred to:PACU  Comments: BP: 139/81  Pulse: 55  Resp: 16  SpO2: 100 %  Temp: 36.7  C (98  F)    Transferred to PACU RN. Patient awake and verbal. Spontaneous resp and on room air. Monitors and alarms on. VSS. Report given.      Vitals: (Last set prior to Anesthesia Care Transfer)    CRNA VITALS  10/9/2020 1539 - 10/9/2020 1613      10/9/2020             Resp Rate (set):  10                Electronically Signed By: RAKESH Kilgore CRNA  October 9, 2020  4:13 PM

## 2020-10-09 NOTE — OP NOTE
General Surgery Operative Note    PREOPERATIVE DIAGNOSIS:  Appendicitis [K37]    POSTOPERATIVE DIAGNOSIS: Perforated appendicitis with generalized peritonitis    PROCEDURE:  LAPAROSCOPIC APPENDECTOMY    ANESTHESIA:  General.    PREOPERATIVE MEDICATIONS: Cefotetan IV    SURGEON:  Jayden Garcia M.D.    ASSISTANT:  Lubna Treadwell PA-C, Physician assistant first assistant was necessary during the performance of this procedure for expertise in patient positioning, prepping, draping, trocar placement, camera management, retraction and exposure, and suctioning.    Drains: 15 round Db-Cabezas drain in the right lower quadrant and pelvis    INDICATIONS:  Patient presented to ED where evaluation was completed. Signs and symptoms were consistent with Appendicitis. CT Abd/Pelvis was also performed and consistent with appendicitis. Procedure was thoroughly described, risks including but not limited to  Bleeding, infection, or visceral injury were outlined. She wished to proceed.    PROCEDURE:  After informed consent was obtained the patient was taken to the operating suite and uneventfully endotracheally intubated.  Abdomen was prepped and draped in a sterile fashion.  Surgeon initiated timeout was acknowledged.  A small skin incision was made in the infraumbilical position after infiltrating with local anesthetic through which a verees needle was passed. Intra-abdominal position was confirmed using the saline drop test. A carbon dioxide pneumoperitoneum was then established.  After adequate insufflation the Veress needle was removed and replaced with a 12 mm trocar.  Two other trocars were placed in the usual positions under laparoscopic visualization after the infiltration of local anesthetic.  Initial surveillance of the abdominal cavity revealed significant peritonitis throughout.  There was fibrinous exudate intertwined between the loops of bowel as well as a fair amount of turbid peritoneal fluid.  There was obvious  evidence of peritonitis along the peritoneal surfaces as well.  Using 2 long graspers, we were able to identify the cecum and the appendix was identified near the ileocecal valve.  The appendix was significantly dilated and there was an obvious site of perforation approximately one third of the way down the appendix from its base.   I was able to grasp the appendix and elevate up toward the anterior abdominal wall.  Using a combination of sharp and blunt dissection, I was able to create a window between the appendix and the mesoappendix near its base. I then fired a 45 mm blue load staple fire across the appendix at its base removing with it a cuff of cecum.  This appeared to be intact.  Following this, I fired a 45 mm white load across the mesoappendix, freeing the appendix from the cecum.  Appendix was placed in a specimen retrieval bag and removed from the abdomen. I again inspected the staple lines and these were all found to be intact and hemostatic.  I then proceeded with four-quadrant lavage of the abdominal cavity with 3 L of normal saline.  This was performed until the effluent was clear as could be.  A 15 round Db-Cabezas drain was placed through the supra pubic port sites and directed in the right lower quadrant pelvis.  This was sutured in position at the skin exit site.  The remaining trochars were removed.  Carbon dioxide was massaged from the abdomen.  The 12 mm port site was closed at the fascial level with a figure-of-eight 0 Vicryl suture.  The skin edges were reapproximated using 4-0 Vicryl and Steri-Strips.  Band-Aids were placed and the patient was awakened.  The patient was uneventfully extubated and taken to PACU in stable condition.  At the conclusion of the case, all lap and needle counts were correct.      ESTIMATED BLOOD LOSS:  5cc      Jayden Garcia MD

## 2020-10-09 NOTE — ANESTHESIA POSTPROCEDURE EVALUATION
Patient: Winston Mcleod    Procedure(s):  LAPAROSCOPIC APPENDECTOMY    Diagnosis:Appendicitis [K37]  Diagnosis Additional Information: No value filed.    Anesthesia Type:  General    Note:  Anesthesia Post Evaluation    Patient location during evaluation: Bedside  Patient participation: Able to fully participate in evaluation  Level of consciousness: awake and alert  Pain management: adequate  Airway patency: patent  Cardiovascular status: acceptable  Respiratory status: acceptable  Hydration status: acceptable  PONV: none             Last vitals:  Vitals:    10/09/20 1645 10/09/20 1700 10/09/20 1715   BP: 114/65 110/57    Pulse: 62 60 61   Resp: 16 16 20   Temp: 37.8  C (100.1  F) 37.8  C (100.1  F) 38.1  C (100.5  F)   SpO2: 96% 95% 97%         Electronically Signed By: King Block MD  October 9, 2020  5:29 PM

## 2020-10-10 LAB
ANION GAP SERPL CALCULATED.3IONS-SCNC: 5 MMOL/L (ref 3–14)
BUN SERPL-MCNC: 17 MG/DL (ref 7–30)
CALCIUM SERPL-MCNC: 8.3 MG/DL (ref 8.5–10.1)
CHLORIDE SERPL-SCNC: 104 MMOL/L (ref 94–109)
CO2 SERPL-SCNC: 27 MMOL/L (ref 20–32)
CREAT SERPL-MCNC: 0.92 MG/DL (ref 0.52–1.04)
ERYTHROCYTE [DISTWIDTH] IN BLOOD BY AUTOMATED COUNT: 13.3 % (ref 10–15)
GFR SERPL CREATININE-BSD FRML MDRD: 61 ML/MIN/{1.73_M2}
GLUCOSE SERPL-MCNC: 140 MG/DL (ref 70–99)
HCT VFR BLD AUTO: 41.2 % (ref 35–47)
HGB BLD-MCNC: 13.7 G/DL (ref 11.7–15.7)
MCH RBC QN AUTO: 30.2 PG (ref 26.5–33)
MCHC RBC AUTO-ENTMCNC: 33.3 G/DL (ref 31.5–36.5)
MCV RBC AUTO: 91 FL (ref 78–100)
PLATELET # BLD AUTO: 197 10E9/L (ref 150–450)
POTASSIUM SERPL-SCNC: 3.9 MMOL/L (ref 3.4–5.3)
RBC # BLD AUTO: 4.53 10E12/L (ref 3.8–5.2)
SODIUM SERPL-SCNC: 136 MMOL/L (ref 133–144)
WBC # BLD AUTO: 9.1 10E9/L (ref 4–11)

## 2020-10-10 PROCEDURE — 250N000013 HC RX MED GY IP 250 OP 250 PS 637: Performed by: PHYSICIAN ASSISTANT

## 2020-10-10 PROCEDURE — 258N000003 HC RX IP 258 OP 636: Performed by: PHYSICIAN ASSISTANT

## 2020-10-10 PROCEDURE — 96376 TX/PRO/DX INJ SAME DRUG ADON: CPT

## 2020-10-10 PROCEDURE — 85027 COMPLETE CBC AUTOMATED: CPT | Performed by: PHYSICIAN ASSISTANT

## 2020-10-10 PROCEDURE — 96372 THER/PROPH/DIAG INJ SC/IM: CPT | Performed by: PHYSICIAN ASSISTANT

## 2020-10-10 PROCEDURE — 36415 COLL VENOUS BLD VENIPUNCTURE: CPT | Performed by: PHYSICIAN ASSISTANT

## 2020-10-10 PROCEDURE — 80048 BASIC METABOLIC PNL TOTAL CA: CPT | Performed by: PHYSICIAN ASSISTANT

## 2020-10-10 PROCEDURE — 96372 THER/PROPH/DIAG INJ SC/IM: CPT

## 2020-10-10 PROCEDURE — 250N000011 HC RX IP 250 OP 636: Performed by: PHYSICIAN ASSISTANT

## 2020-10-10 PROCEDURE — G0378 HOSPITAL OBSERVATION PER HR: HCPCS

## 2020-10-10 PROCEDURE — 250N000009 HC RX 250: Performed by: PHYSICIAN ASSISTANT

## 2020-10-10 PROCEDURE — 96375 TX/PRO/DX INJ NEW DRUG ADDON: CPT

## 2020-10-10 RX ORDER — HEPARIN SODIUM 5000 [USP'U]/.5ML
5000 INJECTION, SOLUTION INTRAVENOUS; SUBCUTANEOUS EVERY 12 HOURS
Status: DISCONTINUED | OUTPATIENT
Start: 2020-10-10 | End: 2020-10-14 | Stop reason: HOSPADM

## 2020-10-10 RX ORDER — MULTIVITAMIN,THERAPEUTIC
1 TABLET ORAL DAILY
COMMUNITY

## 2020-10-10 RX ORDER — TRIAMCINOLONE ACETONIDE 55 UG/1
1 SPRAY, METERED NASAL DAILY PRN
COMMUNITY

## 2020-10-10 RX ORDER — BISACODYL 10 MG
10 SUPPOSITORY, RECTAL RECTAL DAILY PRN
Status: DISCONTINUED | OUTPATIENT
Start: 2020-10-10 | End: 2020-10-11

## 2020-10-10 RX ORDER — IBUPROFEN 200 MG
200 TABLET ORAL DAILY PRN
COMMUNITY

## 2020-10-10 RX ADMIN — FAMOTIDINE 20 MG: 10 INJECTION, SOLUTION INTRAVENOUS at 14:13

## 2020-10-10 RX ADMIN — HEPARIN SODIUM 5000 UNITS: 5000 INJECTION, SOLUTION INTRAVENOUS; SUBCUTANEOUS at 17:19

## 2020-10-10 RX ADMIN — ACETAMINOPHEN 975 MG: 325 TABLET, FILM COATED ORAL at 12:16

## 2020-10-10 RX ADMIN — ACETAMINOPHEN 975 MG: 325 TABLET, FILM COATED ORAL at 18:25

## 2020-10-10 RX ADMIN — POTASSIUM CHLORIDE, DEXTROSE MONOHYDRATE AND SODIUM CHLORIDE: 150; 5; 450 INJECTION, SOLUTION INTRAVENOUS at 12:17

## 2020-10-10 RX ADMIN — PIPERACILLIN SODIUM AND TAZOBACTAM SODIUM 3.38 G: 3; .375 INJECTION, POWDER, LYOPHILIZED, FOR SOLUTION INTRAVENOUS at 00:31

## 2020-10-10 RX ADMIN — ACETAMINOPHEN 975 MG: 325 TABLET, FILM COATED ORAL at 05:55

## 2020-10-10 RX ADMIN — DOCUSATE SODIUM 50 MG AND SENNOSIDES 8.6 MG 1 TABLET: 8.6; 5 TABLET, FILM COATED ORAL at 20:19

## 2020-10-10 RX ADMIN — PIPERACILLIN SODIUM AND TAZOBACTAM SODIUM 3.38 G: 3; .375 INJECTION, POWDER, LYOPHILIZED, FOR SOLUTION INTRAVENOUS at 12:16

## 2020-10-10 RX ADMIN — POTASSIUM CHLORIDE, DEXTROSE MONOHYDRATE AND SODIUM CHLORIDE: 150; 5; 450 INJECTION, SOLUTION INTRAVENOUS at 00:30

## 2020-10-10 RX ADMIN — DOCUSATE SODIUM 50 MG AND SENNOSIDES 8.6 MG 1 TABLET: 8.6; 5 TABLET, FILM COATED ORAL at 09:18

## 2020-10-10 RX ADMIN — FAMOTIDINE 20 MG: 10 INJECTION, SOLUTION INTRAVENOUS at 02:26

## 2020-10-10 RX ADMIN — ACETAMINOPHEN 975 MG: 325 TABLET, FILM COATED ORAL at 00:22

## 2020-10-10 RX ADMIN — OXYCODONE HYDROCHLORIDE 5 MG: 5 TABLET ORAL at 09:18

## 2020-10-10 RX ADMIN — PIPERACILLIN SODIUM AND TAZOBACTAM SODIUM 3.38 G: 3; .375 INJECTION, POWDER, LYOPHILIZED, FOR SOLUTION INTRAVENOUS at 06:00

## 2020-10-10 RX ADMIN — PIPERACILLIN SODIUM AND TAZOBACTAM SODIUM 3.38 G: 3; .375 INJECTION, POWDER, LYOPHILIZED, FOR SOLUTION INTRAVENOUS at 18:25

## 2020-10-10 RX ADMIN — OXYCODONE HYDROCHLORIDE 5 MG: 5 TABLET ORAL at 04:25

## 2020-10-10 NOTE — PHARMACY-ADMISSION MEDICATION HISTORY
Pharmacy Medication History  Admission medication history interview status for the 10/9/2020  admission is complete. See EPIC admission navigator for prior to admission medications       Medication history sources: Patient and Surescripts  Location of interview: Phone  Medication history source reliability: Moderate to poor   Adherence assessment: Poor    Significant changes made to the medication list:  1. Removed diazepam, norco, percocet -- patient reported not taking   2. Added all PTA vitamins/supplements/OTC medications     Additional medication history information:   1. Of note, patient has recent fill history of many prescription medications, but reports not taking any of them. Reports only regular medications as calcium, fish oil, and vitamin D.   Recent prescriptions (1-3 mo ago) filled include  -- alprazolam, azelastine nasal spray,  zaleplon, omeprazole, gabapentin, zolpidem   2. Patient did not know strengths of vitamins/supplements/OTC medications    Medication reconciliation completed by provider prior to medication history? No    Time spent in this activity: 25 minutes    Prior to Admission medications    Medication Sig Last Dose Taking? Auth Provider   CALCIUM PO Take 1 tablet by mouth daily Past Week at Unknown time Yes Unknown, Entered By History   Cholecalciferol (VITAMIN D3 PO) Take 1 tablet by mouth daily Past Week at Unknown time Yes Unknown, Entered By History   ibuprofen (ADVIL/MOTRIN) 200 MG tablet Take 200 mg by mouth daily as needed PRN Yes Unknown, Entered By History   MAGNESIUM PO Take 1 tablet by mouth daily Past Month at Unknown time Yes Unknown, Entered By History   multivitamin, therapeutic (THERA-VIT) TABS tablet Take 1 tablet by mouth daily Past Month at Unknown time Yes Unknown, Entered By History   Omega-3 Fatty Acids (FISH OIL PO) Take 1 capsule by mouth daily Past Week at Unknown time Yes Unknown, Entered By History   triamcinolone (NASACORT) 55 MCG/ACT nasal aerosol Spray 1  spray into both nostrils daily as needed PRN Yes Unknown, Entered By History

## 2020-10-10 NOTE — PROGRESS NOTES
"[facility]  GENERAL SURGERY Progress Note    Admission Date: 10/9/2020  10/10/2020         Assessment and Plan:   Winston Mcleod is a 75 year old female S/P Procedure(s): lap appy for perforated appendicitis, generalized peritonitis, POD 1.  - Sips of clears, await ROBF  - Pain controlled with Tylenol atc   - WBC 13.4-->9.1, on Zosyn  - Continue Pepcid  - Ice BERYL site  - Pathology pending  - Ambulate 4x day and encourage IS              Interval History:   Pain controlled, thirsty, -Flatus/BM, UO adequate, up in room.  Meds reviewed.                      Physical Exam:   Blood pressure 100/65, pulse 67, temperature 98.2  F (36.8  C), temperature source Oral, resp. rate 18, height 1.6 m (5' 3\"), weight 56.7 kg (125 lb), SpO2 95 %.  Temperature Temp  Av  F (37.2  C)  Min: 97.6  F (36.4  C)  Max: 100.5  F (38.1  C)   I/O last 3 completed shifts:  In: 1480 [P.O.:180; I.V.:1300]  Out: 640 [Urine:500; Drains:140]  Constitutional:  Awake, alert, oriented, and in no apparent distress.   Lungs: No increased work of breathing, good air exchange, clear to auscultation bilaterally, and no crackles or wheezing.   Cardiovascular: Regular rate and rhythm, normal S1 and S2, and no murmur noted.   Abdomen: Soft, non-distended, appropriately tender at incision(s), + BS. BERYL drain intact, serosanguinous, tender.   Wound(s): Clean, dry, and intact. No erythema or drainage.    Extremities: No edema or calf tenderness. +SCDs          Data:     Recent Labs   Lab Test 10/10/20  0812 10/09/20  1148   WBC 9.1 13.4*   HGB 13.7 15.2   HCT 41.2 44.5    246      Recent Labs   Lab Test 10/10/20  0812 10/09/20  1148    135   POTASSIUM 3.9 3.9   CHLORIDE 104 104   CO2 27 26   BUN 17 17   CR 0.92 0.70       Lubna V. Treadwell, PA-C  [facility]  [phone]  "

## 2020-10-10 NOTE — DISCHARGE INSTRUCTIONS
[facility]  Discharge Instructions: Post-Operative Laparoscopic Appendectomy    ACTIVITY    Expect to feel tired after your surgery.  This will gradually resolve.      Take frequent, short walks and increase your activity gradually.      Avoid strenuous physical activity or heavy lifting greater than 15-20 lbs. for 2-3 weeks.  You may climb stairs.    You may drive without restrictions when you are not using any prescription pain medication and feel comfortable in a car.    You may return to work/school when you are comfortable without any prescription pain medication.    WOUND CARE    You may remove your outer dressing or Band-Aids and shower 48 hours after the surgery.  Pat your incisions dry and leave them open to air.  Re-apply dressing (Band-Aids or gauze/tape) as needed for comfort or drainage.    You may have steri-strips (looks like white tape) on your incision.  You may peel off the steri-strips 2 weeks after your surgery if they have not peeled off on their own.     Do not soak your incisions in a tub or pool for 2 weeks.     Do not apply any lotions, creams, or ointments to your incisions.    A ridge under your incisions is normal and will gradually resolve.    DIET    Start with liquids, then gradually resume your regular diet as tolerated.  Avoid heavy, spicy, and greasy meals for 2-3 days.    Drink plenty of fluids to stay hydrated.    PAIN    Expect some tenderness and discomfort at the incision sites.  Use the prescribed pain medication at your discretion.  Expect gradual resolution of your pain over several days.    You may take ibuprofen with food (unless you have been told not to) or acetaminophen/Tylenol instead of or in addition to your prescribed pain medication.  If you are taking Norco or Percocet, do not take any additional acetaminophen/Tylenol.    Do not drink alcohol or drive while you are taking pain medications.    You may apply ice to your incisions in 20  minute intervals as needed for the next 48 hours.  After that time, consider switching to heat if you prefer.    EXPECTATIONS    Pain medications can cause constipation.  Limit use when possible.  Take over the counter stool softener/stimulant, such as Colace or Senna, 1-2 times a day with plenty of water.  You may take a mild over the counter laxative, such as Miralax or a suppository, as needed.  You make discontinue these medications once you are having regular bowel movements and/or are no longer taking your narcotic pain medication.     You may have shoulder or upper back discomfort due to the gas used in surgery.  This is temporary and should resolve in 48-72 hours.  Short, frequent walks may help with this.    If you are unable to urinate, or feel as though you are not emptying your bladder adequately, we recommend you call our office and/or seek care at an ER or Urgent Care facility if after hours.    FOLLOW UP    Follow up with Dr. Garcia in 10-14 days per pt request. Call [phone] to schedule an appointment or if you have any concerns. We are located at [address] [place] [address].     CALL OUR OFFICE -[phone] IF YOU HAVE:     Chills or fever above 101 F.    Increased redness, warmth, or drainage at your incisions.    Significant bleeding.    Pain not relieved by your pain medication or rest.    Increasing pain after the first 48 hours.    Any other concerns or questions.           Revised September 2020

## 2020-10-11 PROCEDURE — 96372 THER/PROPH/DIAG INJ SC/IM: CPT

## 2020-10-11 PROCEDURE — 250N000011 HC RX IP 250 OP 636: Performed by: PHYSICIAN ASSISTANT

## 2020-10-11 PROCEDURE — 258N000003 HC RX IP 258 OP 636: Performed by: PHYSICIAN ASSISTANT

## 2020-10-11 PROCEDURE — 250N000013 HC RX MED GY IP 250 OP 250 PS 637: Performed by: PHYSICIAN ASSISTANT

## 2020-10-11 PROCEDURE — 96376 TX/PRO/DX INJ SAME DRUG ADON: CPT

## 2020-10-11 PROCEDURE — G0378 HOSPITAL OBSERVATION PER HR: HCPCS

## 2020-10-11 PROCEDURE — 250N000009 HC RX 250: Performed by: PHYSICIAN ASSISTANT

## 2020-10-11 PROCEDURE — 96372 THER/PROPH/DIAG INJ SC/IM: CPT | Performed by: PHYSICIAN ASSISTANT

## 2020-10-11 RX ORDER — BISACODYL 10 MG
10 SUPPOSITORY, RECTAL RECTAL ONCE
Status: COMPLETED | OUTPATIENT
Start: 2020-10-11 | End: 2020-10-11

## 2020-10-11 RX ORDER — OXYCODONE HYDROCHLORIDE 5 MG/1
5 TABLET ORAL EVERY 4 HOURS PRN
Qty: 12 TABLET | Refills: 0 | Status: SHIPPED | OUTPATIENT
Start: 2020-10-11 | End: 2020-10-16

## 2020-10-11 RX ORDER — BISACODYL 10 MG
10 SUPPOSITORY, RECTAL RECTAL DAILY PRN
Status: DISCONTINUED | OUTPATIENT
Start: 2020-10-11 | End: 2020-10-14 | Stop reason: HOSPADM

## 2020-10-11 RX ORDER — SIMETHICONE 80 MG
80 TABLET,CHEWABLE ORAL 4 TIMES DAILY
Status: DISCONTINUED | OUTPATIENT
Start: 2020-10-11 | End: 2020-10-14 | Stop reason: HOSPADM

## 2020-10-11 RX ORDER — DEXTROSE MONOHYDRATE, SODIUM CHLORIDE, AND POTASSIUM CHLORIDE 50; 1.49; 4.5 G/1000ML; G/1000ML; G/1000ML
INJECTION, SOLUTION INTRAVENOUS CONTINUOUS
Status: DISCONTINUED | OUTPATIENT
Start: 2020-10-11 | End: 2020-10-14 | Stop reason: HOSPADM

## 2020-10-11 RX ORDER — AMOXICILLIN 250 MG
1 CAPSULE ORAL 2 TIMES DAILY
Qty: 12 TABLET | Refills: 0 | Status: SHIPPED | OUTPATIENT
Start: 2020-10-11 | End: 2020-11-19

## 2020-10-11 RX ADMIN — PIPERACILLIN SODIUM AND TAZOBACTAM SODIUM 3.38 G: 3; .375 INJECTION, POWDER, LYOPHILIZED, FOR SOLUTION INTRAVENOUS at 14:09

## 2020-10-11 RX ADMIN — HEPARIN SODIUM 5000 UNITS: 5000 INJECTION, SOLUTION INTRAVENOUS; SUBCUTANEOUS at 16:20

## 2020-10-11 RX ADMIN — POTASSIUM CHLORIDE, DEXTROSE MONOHYDRATE AND SODIUM CHLORIDE: 150; 5; 450 INJECTION, SOLUTION INTRAVENOUS at 13:08

## 2020-10-11 RX ADMIN — DOCUSATE SODIUM 50 MG AND SENNOSIDES 8.6 MG 1 TABLET: 8.6; 5 TABLET, FILM COATED ORAL at 07:58

## 2020-10-11 RX ADMIN — SIMETHICONE 80 MG: 80 TABLET, CHEWABLE ORAL at 21:03

## 2020-10-11 RX ADMIN — SIMETHICONE 80 MG: 80 TABLET, CHEWABLE ORAL at 17:56

## 2020-10-11 RX ADMIN — OXYCODONE HYDROCHLORIDE 5 MG: 5 TABLET ORAL at 03:45

## 2020-10-11 RX ADMIN — ACETAMINOPHEN 975 MG: 325 TABLET, FILM COATED ORAL at 17:56

## 2020-10-11 RX ADMIN — POTASSIUM CHLORIDE, DEXTROSE MONOHYDRATE AND SODIUM CHLORIDE: 150; 5; 450 INJECTION, SOLUTION INTRAVENOUS at 16:15

## 2020-10-11 RX ADMIN — HEPARIN SODIUM 5000 UNITS: 5000 INJECTION, SOLUTION INTRAVENOUS; SUBCUTANEOUS at 04:16

## 2020-10-11 RX ADMIN — FAMOTIDINE 20 MG: 10 INJECTION, SOLUTION INTRAVENOUS at 13:08

## 2020-10-11 RX ADMIN — ACETAMINOPHEN 975 MG: 325 TABLET, FILM COATED ORAL at 11:48

## 2020-10-11 RX ADMIN — PIPERACILLIN SODIUM AND TAZOBACTAM SODIUM 3.38 G: 3; .375 INJECTION, POWDER, LYOPHILIZED, FOR SOLUTION INTRAVENOUS at 21:03

## 2020-10-11 RX ADMIN — ACETAMINOPHEN 975 MG: 325 TABLET, FILM COATED ORAL at 00:59

## 2020-10-11 RX ADMIN — FAMOTIDINE 20 MG: 10 INJECTION, SOLUTION INTRAVENOUS at 01:04

## 2020-10-11 RX ADMIN — ACETAMINOPHEN 975 MG: 325 TABLET, FILM COATED ORAL at 05:59

## 2020-10-11 RX ADMIN — PIPERACILLIN SODIUM AND TAZOBACTAM SODIUM 3.38 G: 3; .375 INJECTION, POWDER, LYOPHILIZED, FOR SOLUTION INTRAVENOUS at 03:55

## 2020-10-11 RX ADMIN — PIPERACILLIN SODIUM AND TAZOBACTAM SODIUM 3.38 G: 3; .375 INJECTION, POWDER, LYOPHILIZED, FOR SOLUTION INTRAVENOUS at 08:01

## 2020-10-11 RX ADMIN — DOCUSATE SODIUM 50 MG AND SENNOSIDES 8.6 MG 1 TABLET: 8.6; 5 TABLET, FILM COATED ORAL at 21:03

## 2020-10-11 RX ADMIN — BISACODYL 10 MG: 10 SUPPOSITORY RECTAL at 13:13

## 2020-10-11 RX ADMIN — SIMETHICONE 80 MG: 80 TABLET, CHEWABLE ORAL at 13:08

## 2020-10-11 NOTE — PROGRESS NOTES
"[facility]  GENERAL SURGERY Progress Note    Admission Date: 10/9/2020  10/11/2020         Assessment and Plan:   Winston Mcleod is a 75 year old female S/P Procedure(s):  lap appy for perforated appendicitis, generalized peritonitis, POD 2.  - Start clears with ROBF  - Dulocalx supp  - Pain controlled with Tylenol ATC and oxy PRN  - WBC 9.1 yetserday, on Zosyn  - Continue Pepcid and Heparin for prophylaxis  - Simethicone ATC  - Decrease IVF  - Check labs tomorrow   - Pathology pending  - Ambulate 4x day and encourage IS              Interval History:   Sore but pain controlled, feels bloated, requests tucks wipes (ordered), -Flatus/BM, UO adequate, ambulating.  Meds reviewed.                      Physical Exam:   Blood pressure 97/57, pulse 55, temperature 97.6  F (36.4  C), temperature source Oral, resp. rate 16, height 1.6 m (5' 3\"), weight 56.7 kg (125 lb), SpO2 96 %.  Temperature Temp  Av.4  F (36.9  C)  Min: 97.6  F (36.4  C)  Max: 99  F (37.2  C)   I/O last 3 completed shifts:  In: 120 [P.O.:120]  Out: 2145 [Urine:2000; Drains:145]  Constitutional:  Awake, alert, oriented, and in no apparent distress.   Lungs: No increased work of breathing, good air exchange, clear to auscultation bilaterally, and no crackles or wheezing.   Cardiovascular: Regular rate and rhythm, normal S1 and S2, and no murmur noted.   Abdomen: Soft, moderately distended, appropriately tender at incision(s). BERYL drain intact, serosanguinous.   Wound(s): Clean, dry, and intact. No erythema or drainage.    Extremities: No edema or calf tenderness. +SCDs          Data:     Recent Labs   Lab Test 10/10/20  0812 10/09/20  1148   WBC 9.1 13.4*   HGB 13.7 15.2   HCT 41.2 44.5    246      Recent Labs   Lab Test 10/10/20  0812 10/09/20  1148    135   POTASSIUM 3.9 3.9   CHLORIDE 104 104   CO2 27 26   BUN 17 17   CR 0.92 0.70     Lubna Treadwell PA-C  [facility]  [phone]  "

## 2020-10-11 NOTE — PROGRESS NOTES
General Surgery    Paged about patient and  being frustrated with her progress. Discussed surgery for perf appy and intraop findings of generalized peritonitis with patient and . We discussed bowel rest and awaiting return of bowel function.  She just had a suppository and did not have any results. She asks if she can have another. Dulcolax supp prn order placed. Abd was moderately distended on exam today. She may have sips and can be advanced to clears once she has flatus. After our discussion, patient and her  state they have a better understanding of plan and their questions were answered.  Plan discussed with RN.    Lubna Treadwell PA-C

## 2020-10-11 NOTE — PROGRESS NOTES
MD Notification    Notified Person: MD    Notified Person Name: Lubna Treadwell    Notification Date/Time:10/11/2020 1440    Notification Interaction:Page    Purpose of Notification:326 HK  Pt extremely frustrated. Senna and suppository given without success. Flatus -. Is there anything else we can try? Thanks.  Elda [phone]    Orders Received:Continue with bowel rest. Reassess tomorrow.     Comments:

## 2020-10-12 PROBLEM — G89.18 ACUTE POST-OPERATIVE PAIN: Status: ACTIVE | Noted: 2020-10-12

## 2020-10-12 PROBLEM — K35.32 PERFORATED APPENDICITIS: Status: ACTIVE | Noted: 2020-10-12

## 2020-10-12 LAB
ANION GAP SERPL CALCULATED.3IONS-SCNC: 2 MMOL/L (ref 3–14)
BUN SERPL-MCNC: 12 MG/DL (ref 7–30)
CALCIUM SERPL-MCNC: 8.8 MG/DL (ref 8.5–10.1)
CHLORIDE SERPL-SCNC: 108 MMOL/L (ref 94–109)
CO2 SERPL-SCNC: 28 MMOL/L (ref 20–32)
CREAT SERPL-MCNC: 0.78 MG/DL (ref 0.52–1.04)
ERYTHROCYTE [DISTWIDTH] IN BLOOD BY AUTOMATED COUNT: 13.5 % (ref 10–15)
GFR SERPL CREATININE-BSD FRML MDRD: 75 ML/MIN/{1.73_M2}
GLUCOSE SERPL-MCNC: 105 MG/DL (ref 70–99)
HCT VFR BLD AUTO: 42.3 % (ref 35–47)
HGB BLD-MCNC: 14.1 G/DL (ref 11.7–15.7)
MCH RBC QN AUTO: 30.4 PG (ref 26.5–33)
MCHC RBC AUTO-ENTMCNC: 33.3 G/DL (ref 31.5–36.5)
MCV RBC AUTO: 91 FL (ref 78–100)
PLATELET # BLD AUTO: 239 10E9/L (ref 150–450)
POTASSIUM SERPL-SCNC: 3.9 MMOL/L (ref 3.4–5.3)
RBC # BLD AUTO: 4.64 10E12/L (ref 3.8–5.2)
SODIUM SERPL-SCNC: 138 MMOL/L (ref 133–144)
WBC # BLD AUTO: 6.9 10E9/L (ref 4–11)

## 2020-10-12 PROCEDURE — 36415 COLL VENOUS BLD VENIPUNCTURE: CPT | Performed by: PHYSICIAN ASSISTANT

## 2020-10-12 PROCEDURE — 96372 THER/PROPH/DIAG INJ SC/IM: CPT

## 2020-10-12 PROCEDURE — G0378 HOSPITAL OBSERVATION PER HR: HCPCS

## 2020-10-12 PROCEDURE — 250N000009 HC RX 250: Performed by: PHYSICIAN ASSISTANT

## 2020-10-12 PROCEDURE — 120N000001 HC R&B MED SURG/OB

## 2020-10-12 PROCEDURE — 80048 BASIC METABOLIC PNL TOTAL CA: CPT | Performed by: PHYSICIAN ASSISTANT

## 2020-10-12 PROCEDURE — 85027 COMPLETE CBC AUTOMATED: CPT | Performed by: PHYSICIAN ASSISTANT

## 2020-10-12 PROCEDURE — 96372 THER/PROPH/DIAG INJ SC/IM: CPT | Performed by: PHYSICIAN ASSISTANT

## 2020-10-12 PROCEDURE — 250N000011 HC RX IP 250 OP 636: Performed by: PHYSICIAN ASSISTANT

## 2020-10-12 PROCEDURE — 250N000013 HC RX MED GY IP 250 OP 250 PS 637: Performed by: PHYSICIAN ASSISTANT

## 2020-10-12 PROCEDURE — 96376 TX/PRO/DX INJ SAME DRUG ADON: CPT

## 2020-10-12 RX ORDER — BISACODYL 10 MG
10 SUPPOSITORY, RECTAL RECTAL ONCE
Status: COMPLETED | OUTPATIENT
Start: 2020-10-12 | End: 2020-10-12

## 2020-10-12 RX ADMIN — SIMETHICONE 80 MG: 80 TABLET, CHEWABLE ORAL at 12:23

## 2020-10-12 RX ADMIN — FAMOTIDINE 20 MG: 10 INJECTION, SOLUTION INTRAVENOUS at 13:20

## 2020-10-12 RX ADMIN — ACETAMINOPHEN 975 MG: 325 TABLET, FILM COATED ORAL at 18:00

## 2020-10-12 RX ADMIN — SIMETHICONE 80 MG: 80 TABLET, CHEWABLE ORAL at 18:00

## 2020-10-12 RX ADMIN — PIPERACILLIN SODIUM AND TAZOBACTAM SODIUM 3.38 G: 3; .375 INJECTION, POWDER, LYOPHILIZED, FOR SOLUTION INTRAVENOUS at 20:10

## 2020-10-12 RX ADMIN — ACETAMINOPHEN 975 MG: 325 TABLET, FILM COATED ORAL at 06:58

## 2020-10-12 RX ADMIN — PIPERACILLIN SODIUM AND TAZOBACTAM SODIUM 3.38 G: 3; .375 INJECTION, POWDER, LYOPHILIZED, FOR SOLUTION INTRAVENOUS at 08:34

## 2020-10-12 RX ADMIN — PIPERACILLIN SODIUM AND TAZOBACTAM SODIUM 3.38 G: 3; .375 INJECTION, POWDER, LYOPHILIZED, FOR SOLUTION INTRAVENOUS at 04:16

## 2020-10-12 RX ADMIN — ACETAMINOPHEN 975 MG: 325 TABLET, FILM COATED ORAL at 01:02

## 2020-10-12 RX ADMIN — DOCUSATE SODIUM 50 MG AND SENNOSIDES 8.6 MG 1 TABLET: 8.6; 5 TABLET, FILM COATED ORAL at 08:34

## 2020-10-12 RX ADMIN — BISACODYL 10 MG: 10 SUPPOSITORY RECTAL at 12:23

## 2020-10-12 RX ADMIN — SIMETHICONE 80 MG: 80 TABLET, CHEWABLE ORAL at 08:34

## 2020-10-12 RX ADMIN — OXYCODONE HYDROCHLORIDE 5 MG: 5 TABLET ORAL at 01:06

## 2020-10-12 RX ADMIN — SIMETHICONE 80 MG: 80 TABLET, CHEWABLE ORAL at 21:18

## 2020-10-12 RX ADMIN — PIPERACILLIN SODIUM AND TAZOBACTAM SODIUM 3.38 G: 3; .375 INJECTION, POWDER, LYOPHILIZED, FOR SOLUTION INTRAVENOUS at 14:08

## 2020-10-12 RX ADMIN — FAMOTIDINE 20 MG: 10 INJECTION, SOLUTION INTRAVENOUS at 01:02

## 2020-10-12 RX ADMIN — HEPARIN SODIUM 5000 UNITS: 5000 INJECTION, SOLUTION INTRAVENOUS; SUBCUTANEOUS at 04:16

## 2020-10-12 RX ADMIN — HEPARIN SODIUM 5000 UNITS: 5000 INJECTION, SOLUTION INTRAVENOUS; SUBCUTANEOUS at 16:18

## 2020-10-12 RX ADMIN — ACETAMINOPHEN 975 MG: 325 TABLET, FILM COATED ORAL at 12:23

## 2020-10-12 ASSESSMENT — ACTIVITIES OF DAILY LIVING (ADL): ADLS_ACUITY_SCORE: 15

## 2020-10-12 NOTE — PROGRESS NOTES
"[facility]  GENERAL SURGERY Progress Note    Admission Date: 10/9/2020  10/12/2020         Assessment and Plan:   Winston Mcleod is a 75 year old female S/P Procedure(s):  Lap appy for perforated appendicitis, generalized peritonitis, POD 3.  - Still moderately distended, fulls for dinner  - Dulcolax supp at noon  - Pain controlled with Tylenol ATC and oxy PRN  - WBC 6.9, on Zosyn, will transition to Augmentin for total of 10 days  - Continue BERYL  - Continue Pepcid and Heparin for prophylaxis  - Pathology pending  - Ambulate 4x day and encourage IS  - Home 1-2 days             Interval History:   Pain controlled, tolerating clears, +Flatus/BM, UO adequate, ambulating.  Meds reviewed.                      Physical Exam:   Blood pressure (!) 144/85, pulse 63, temperature 98.7  F (37.1  C), temperature source Oral, resp. rate 18, height 1.6 m (5' 3\"), weight 56.7 kg (125 lb), SpO2 96 %.  Temperature Temp  Av.8  F (37.1  C)  Min: 98.6  F (37  C)  Max: 99.1  F (37.3  C)   I/O last 3 completed shifts:  In: 240 [P.O.:240]  Out: 530 [Urine:500; Drains:30]  Constitutional:  Awake, alert, oriented, and in no apparent distress.   Lungs: No increased work of breathing, good air exchange, clear to auscultation bilaterally, and no crackles or wheezing.   Cardiovascular: Regular rate and rhythm, normal S1 and S2, and no murmur noted.   Abdomen: Soft, non-distended, appropriately tender at incision(s), + BS. BERYL drain intact, serosanguinous.   Wound(s): Clean, dry, and intact. No erythema or drainage.    Extremities: No edema or calf tenderness. +SCDs          Data:     Recent Labs   Lab Test 10/12/20  0725 10/10/20  0812 10/09/20  1148   WBC 6.9 9.1 13.4*   HGB 14.1 13.7 15.2   HCT 42.3 41.2 44.5    197 246      Recent Labs   Lab Test 10/12/20  0725 10/10/20  0812 10/09/20  1148    136 135   POTASSIUM 3.9 3.9 3.9   CHLORIDE 108 104 104   CO2 28 27 26   BUN 12 17 17   CR 0.78 0.92 0.70     Lubna LIBRADO. " AZRA Treadwell  [facility]  [phone]

## 2020-10-12 NOTE — UTILIZATION REVIEW
Admission Status; Secondary Review Determination     Admission Date: 10/9/2020 11:25 AM       Under the authority of the Utilization Management Committee, the utilization review process indicated a secondary review on the above patient.  The review outcome is based on review of the medical records, discussions with staff, and applying clinical experience noted on the date of the review.        (x)      Inpatient Status Appropriate - This patient's medical care is consistent with medical management for inpatient care and reasonable inpatient medical practice.       RATIONALE FOR DETERMINATION      Brief clinical presentation, information copied from the chart, abbreviated and edited for relevant content:     Winston Mcleod is a 75 year old female S/P Procedure(s): Lap appy for perforated appendicitis, generalized peritonitis, POD 3. Patient was initially OP, changed to OBS, but today Still moderately distended, fulls for dinner  She is on IV Zosyn, will transition to Augmentin for total of 10 days. Plan to Continue BERYL and Continue Pepcid and Heparin for prophylaxis. Pathology pending. Home 1-2 days      Currently, more than 2 nights hospital complex care was anticipated. Also, there was a risk of adverse outcome if patient was treated outpatient or observation. High intensity of services anticipated. Inpatient admission appropriate based on Medicare guidelines.       The information on this document is developed by the utilization review team in order for the business office to ensure compliance.  This only denotes the appropriateness of proper admission status and does not reflect the quality of care rendered.         The definitions of Inpatient Status and Observation Status used in making the determination above are those provided in the CMS Coverage Manual, Chapter 1 and Chapter 6, section 70.4.      Sincerely,      Bren Valdivia MD   Utilization Review/ Case Management  [facility].

## 2020-10-13 LAB
COPATH REPORT: NORMAL
HGB BLD-MCNC: 14.4 G/DL (ref 11.7–15.7)
PLATELET # BLD AUTO: 271 10E9/L (ref 150–450)

## 2020-10-13 PROCEDURE — 85018 HEMOGLOBIN: CPT | Performed by: PHYSICIAN ASSISTANT

## 2020-10-13 PROCEDURE — 250N000013 HC RX MED GY IP 250 OP 250 PS 637: Performed by: PHYSICIAN ASSISTANT

## 2020-10-13 PROCEDURE — 250N000011 HC RX IP 250 OP 636: Performed by: PHYSICIAN ASSISTANT

## 2020-10-13 PROCEDURE — 85049 AUTOMATED PLATELET COUNT: CPT | Performed by: PHYSICIAN ASSISTANT

## 2020-10-13 PROCEDURE — 250N000009 HC RX 250: Performed by: PHYSICIAN ASSISTANT

## 2020-10-13 PROCEDURE — 120N000001 HC R&B MED SURG/OB

## 2020-10-13 PROCEDURE — 250N000013 HC RX MED GY IP 250 OP 250 PS 637

## 2020-10-13 PROCEDURE — 36415 COLL VENOUS BLD VENIPUNCTURE: CPT | Performed by: PHYSICIAN ASSISTANT

## 2020-10-13 RX ORDER — AMOXICILLIN 250 MG
1 CAPSULE ORAL 2 TIMES DAILY PRN
Status: DISCONTINUED | OUTPATIENT
Start: 2020-10-13 | End: 2020-10-14 | Stop reason: HOSPADM

## 2020-10-13 RX ORDER — FAMOTIDINE 20 MG/1
20 TABLET, FILM COATED ORAL 2 TIMES DAILY
Status: DISCONTINUED | OUTPATIENT
Start: 2020-10-13 | End: 2020-10-14 | Stop reason: HOSPADM

## 2020-10-13 RX ADMIN — PIPERACILLIN SODIUM AND TAZOBACTAM SODIUM 3.38 G: 3; .375 INJECTION, POWDER, LYOPHILIZED, FOR SOLUTION INTRAVENOUS at 09:57

## 2020-10-13 RX ADMIN — SIMETHICONE 80 MG: 80 TABLET, CHEWABLE ORAL at 14:01

## 2020-10-13 RX ADMIN — ACETAMINOPHEN 975 MG: 325 TABLET, FILM COATED ORAL at 16:10

## 2020-10-13 RX ADMIN — ACETAMINOPHEN 975 MG: 325 TABLET, FILM COATED ORAL at 21:07

## 2020-10-13 RX ADMIN — SIMETHICONE 80 MG: 80 TABLET, CHEWABLE ORAL at 21:07

## 2020-10-13 RX ADMIN — HEPARIN SODIUM 5000 UNITS: 5000 INJECTION, SOLUTION INTRAVENOUS; SUBCUTANEOUS at 16:16

## 2020-10-13 RX ADMIN — FAMOTIDINE 20 MG: 10 INJECTION, SOLUTION INTRAVENOUS at 01:03

## 2020-10-13 RX ADMIN — ACETAMINOPHEN 975 MG: 325 TABLET, FILM COATED ORAL at 09:55

## 2020-10-13 RX ADMIN — OXYCODONE HYDROCHLORIDE 5 MG: 5 TABLET ORAL at 18:01

## 2020-10-13 RX ADMIN — FAMOTIDINE 20 MG: 20 TABLET ORAL at 21:07

## 2020-10-13 RX ADMIN — PIPERACILLIN SODIUM AND TAZOBACTAM SODIUM 3.38 G: 3; .375 INJECTION, POWDER, LYOPHILIZED, FOR SOLUTION INTRAVENOUS at 04:03

## 2020-10-13 RX ADMIN — HEPARIN SODIUM 5000 UNITS: 5000 INJECTION, SOLUTION INTRAVENOUS; SUBCUTANEOUS at 04:03

## 2020-10-13 RX ADMIN — ACETAMINOPHEN 975 MG: 325 TABLET, FILM COATED ORAL at 01:03

## 2020-10-13 RX ADMIN — PIPERACILLIN SODIUM AND TAZOBACTAM SODIUM 3.38 G: 3; .375 INJECTION, POWDER, LYOPHILIZED, FOR SOLUTION INTRAVENOUS at 17:56

## 2020-10-13 RX ADMIN — SIMETHICONE 80 MG: 80 TABLET, CHEWABLE ORAL at 09:56

## 2020-10-13 RX ADMIN — OXYCODONE HYDROCHLORIDE 5 MG: 5 TABLET ORAL at 14:01

## 2020-10-13 RX ADMIN — SIMETHICONE 80 MG: 80 TABLET, CHEWABLE ORAL at 18:01

## 2020-10-13 ASSESSMENT — ACTIVITIES OF DAILY LIVING (ADL)
ADLS_ACUITY_SCORE: 15
ADLS_ACUITY_SCORE: 17
ADLS_ACUITY_SCORE: 15
ADLS_ACUITY_SCORE: 19

## 2020-10-13 NOTE — PROGRESS NOTES
[facility]    General Surgery  Daily Post-Op Note       Assessment and Plan:   Winston Mcleod is a 75 year old female 4 days s/p lap appy for perforated appendicitis with generalized peritonitis    PLAN:  - Hgb add-on or draw am due to two maroon bowel movements. Patient with history of hemorrhoids and keep scheduled colonoscopy next June. **ADDENDUM** Hgb wnl at 14.4.  - Continue scheduled tylenol and oxycodone for refractory pain.  - Having loose BM's. Senokot switched to BID prn.   - Advanced to regular diet.  - Shower today and then remove BERYL drain.  - WBC wnl yesterday. Completed 3 days of Zosyn, will complete 10 days total of antibiotics. Will transition to Augmentin at discharge.    DISPOSITION:  Discharge likely tomorrow if tolerates diet advancement. Dr. Garcia was present for patient exam and discussed likely discharge home tomorrow when she has assistance available.        Interval History:   Winston Mcleod is seen this morning on surgical rounds. She has been having diarrhea this morning, two of which were maroon in color. She does have history of bleeding hemorrhoids and is due for colonoscopy next June. She is passing flatus and tolerating full liquid diet. Winston is concerned with returning home due to lack of assistance with activities today.         Physical Exam:   Temp: 98.6  F (37  C) Temp src: Oral BP: (!) 167/102 Pulse: 63   Resp: 16 SpO2: 97 % O2 Device: None (Room air)      I/O last 3 completed shifts:  In: 1080 [P.O.:1080]  Out: 40 [Drains:40]    Drain: 40 ml/24 hours serous    GENERAL: VS reviewed, alert, oriented, no acute distress  LUNGS: Normal respiratory effort, no wheezing  ABDOMEN:  Soft, non-distended, minimally tender throughout abdomen  INCISION: Clean, dry, and intact. No surrounding erythema.  EXTREMITIES: Moving all extremities, no gross deformities  NEUROLOGICAL: Grossly non-focal, mood & affect appropriate    Data   Recent Labs   Lab 10/13/20  0804  10/12/20  0725 10/10/20  0812 10/09/20  1148   WBC  --  6.9 9.1 13.4*   HGB  --  14.1 13.7 15.2   MCV  --  91 91 90    239 197 246   NA  --  138 136 135   POTASSIUM  --  3.9 3.9 3.9   CHLORIDE  --  108 104 104   CO2  --  28 27 26   BUN  --  12 17 17   CR  --  0.78 0.92 0.70   ANIONGAP  --  2* 5 5   SHAHAB  --  8.8 8.3* 9.0   GLC  --  105* 140* 144*   ALBUMIN  --   --   --  3.9   PROTTOTAL  --   --   --  7.5   BILITOTAL  --   --   --  0.8   ALKPHOS  --   --   --  86   ALT  --   --   --  25   AST  --   --   --  17       Marjorie Gastelum PA-C

## 2020-10-14 VITALS
BODY MASS INDEX: 22.15 KG/M2 | HEIGHT: 63 IN | RESPIRATION RATE: 16 BRPM | HEART RATE: 71 BPM | SYSTOLIC BLOOD PRESSURE: 140 MMHG | OXYGEN SATURATION: 93 % | DIASTOLIC BLOOD PRESSURE: 80 MMHG | WEIGHT: 125 LBS | TEMPERATURE: 98.6 F

## 2020-10-14 LAB — PLATELET # BLD AUTO: 272 10E9/L (ref 150–450)

## 2020-10-14 PROCEDURE — 250N000011 HC RX IP 250 OP 636: Performed by: PHYSICIAN ASSISTANT

## 2020-10-14 PROCEDURE — 250N000013 HC RX MED GY IP 250 OP 250 PS 637: Performed by: PHYSICIAN ASSISTANT

## 2020-10-14 PROCEDURE — 250N000013 HC RX MED GY IP 250 OP 250 PS 637

## 2020-10-14 PROCEDURE — 85049 AUTOMATED PLATELET COUNT: CPT | Performed by: SURGERY

## 2020-10-14 RX ORDER — AMOXICILLIN 250 MG
1 CAPSULE ORAL DAILY
Qty: 10 TABLET | Refills: 0 | Status: SHIPPED | OUTPATIENT
Start: 2020-10-14 | End: 2020-11-19

## 2020-10-14 RX ADMIN — ACETAMINOPHEN 975 MG: 325 TABLET, FILM COATED ORAL at 10:10

## 2020-10-14 RX ADMIN — FAMOTIDINE 20 MG: 20 TABLET ORAL at 10:10

## 2020-10-14 RX ADMIN — OXYCODONE HYDROCHLORIDE 5 MG: 5 TABLET ORAL at 04:43

## 2020-10-14 RX ADMIN — SIMETHICONE 80 MG: 80 TABLET, CHEWABLE ORAL at 10:10

## 2020-10-14 RX ADMIN — HEPARIN SODIUM 5000 UNITS: 5000 INJECTION, SOLUTION INTRAVENOUS; SUBCUTANEOUS at 03:11

## 2020-10-14 RX ADMIN — OXYCODONE HYDROCHLORIDE 5 MG: 5 TABLET ORAL at 11:22

## 2020-10-14 RX ADMIN — PIPERACILLIN SODIUM AND TAZOBACTAM SODIUM 3.38 G: 3; .375 INJECTION, POWDER, LYOPHILIZED, FOR SOLUTION INTRAVENOUS at 10:12

## 2020-10-14 RX ADMIN — ACETAMINOPHEN 975 MG: 325 TABLET, FILM COATED ORAL at 03:12

## 2020-10-14 RX ADMIN — PIPERACILLIN SODIUM AND TAZOBACTAM SODIUM 3.38 G: 3; .375 INJECTION, POWDER, LYOPHILIZED, FOR SOLUTION INTRAVENOUS at 03:12

## 2020-10-14 RX ADMIN — SIMETHICONE 80 MG: 80 TABLET, CHEWABLE ORAL at 12:50

## 2020-10-14 RX ADMIN — OXYCODONE HYDROCHLORIDE 5 MG: 5 TABLET ORAL at 01:54

## 2020-10-14 ASSESSMENT — ACTIVITIES OF DAILY LIVING (ADL)
ADLS_ACUITY_SCORE: 19

## 2020-10-14 NOTE — DISCHARGE SUMMARY
"Discharge Summary    Winston Mcleod MRN# [id number]   YOB: 1945 Age: 75 year old     Date of Admission:  10/9/2020  Date of Discharge:  10/14/2020  Admitting Physician:  Jayden Garcia MD  Discharging Service:  Surgery  Primary Provider: Giovanni Francis         Admission/Discharge Diagnosis:   Principle Diagnosis: Perforated appendicitis with generalized peritonitis  Secondary diagnosis: Pulmonary nodules [R91.8], Cholelithiasis [K80.20]         Procedures:   Procedure(s):  LAPAROSCOPIC APPENDECTOMY         Brief History of Illness:   This patient was a 75 year old female who presented with RLQ abdominal pain and associated nausea.  She was found to have leukocytosis and her imaging was consistent with acute appendicitis.  After discussing the risks, benefits, and possible complications, an informed consent was obtained and the patient underwent the above procedure. She was admitted post operatively due to findings of perforated appendicitis.  Please see the Operative Report for full details.           Hospital Course:   The patient had a post op ileus as expected which resolved by POD 3.  Her BERYL drain was removed POD 4. The patient's pain was controlled and she was tolerating food on day of discharge.  The patient was discharged to home in stable condition by the surgical service.  She verbalized understanding of all discharge instructions.  She was asked to call with any further questions or concerns.  Please see chart for details.          Consultations:     No consultations were requested during this admission          Discharge Disposition:     Discharged to home          Condition on Discharge:     Discharge condition: Stable   Discharge vitals: Blood pressure 136/81, pulse 67, temperature 98.3  F (36.8  C), temperature source Oral, resp. rate 16, height 1.6 m (5' 3\"), weight 56.7 kg (125 lb), SpO2 96 %.          Discharge Medications:     Current Discharge Medication List      START " taking these medications    Details   amoxicillin-clavulanate (AUGMENTIN) 875-125 MG tablet Take 1 tablet by mouth 2 times daily  Qty: 12 tablet, Refills: 0    Associated Diagnoses: Acute appendicitis with perforation, generalized peritonitis, and gangrene, without abscess      oxyCODONE (ROXICODONE) 5 MG tablet Take 1 tablet (5 mg) by mouth every 4 hours as needed for moderate to severe pain  Qty: 12 tablet, Refills: 0    Associated Diagnoses: Acute post-operative pain      !! senna-docusate (SENOKOT-S/PERICOLACE) 8.6-50 MG tablet Take 1 tablet by mouth daily  Qty: 10 tablet, Refills: 0    Associated Diagnoses: Acute post-operative pain      !! senna-docusate (SENOKOT-S/PERICOLACE) 8.6-50 MG tablet Take 1 tablet by mouth 2 times daily  Qty: 12 tablet, Refills: 0    Associated Diagnoses: Acute post-operative pain       !! - Potential duplicate medications found. Please discuss with provider.      CONTINUE these medications which have NOT CHANGED    Details   CALCIUM PO Take 1 tablet by mouth daily      Cholecalciferol (VITAMIN D3 PO) Take 1 tablet by mouth daily      ibuprofen (ADVIL/MOTRIN) 200 MG tablet Take 200 mg by mouth daily as needed      MAGNESIUM PO Take 1 tablet by mouth daily      multivitamin, therapeutic (THERA-VIT) TABS tablet Take 1 tablet by mouth daily      Omega-3 Fatty Acids (FISH OIL PO) Take 1 capsule by mouth daily      triamcinolone (NASACORT) 55 MCG/ACT nasal aerosol Spray 1 spray into both nostrils daily as needed                Discharge Instructions:   See surgery discharge instruction sheet. You are on antibiotics. Hold   your stool softener if you are experiencing loose bowel movements. Stay   hydrated and take probiotics.    Follow up with your PCP in 2-3 weeks for follow up for incidental finding  of pulmonary nodules.        After Care Instructions     Activity      Please see attached discharge instructions.         Diet      Start with bland diet, then gradually resume your regular  diet as tolerated. Avoid heavy, spicy, and greasy meals for 2-3 days. Drink plenty of fluids to stay hydrated.             Lubna Treadwell PA-C, dictating on behalf of Jayden Garcia MD  Office #: [phone]

## 2020-10-14 NOTE — PROGRESS NOTES
"[facility]  GENERAL SURGERY Progress Note    Admission Date: 10/9/2020  10/14/2020         Assessment and Plan:   Winston Mcleod is a 75 year old female S/P Procedure(s):  Lap appy for perforated appendicitis, generalized peritonitis, POD 5.  - Regular diet  - Pain controlled with Tylenol ATC and oxy PRN  - Transition Zosyn to Augmentin for total of 10 daysis  - Shower  - Ambulate 4x day and encourage IS  - Home  - Discharge instructions discussed and on chart.             Interval History:   Little sore over right abdomen, pain controlled, tolerating diet, +Flatus/BM, UO adequate, ambulating.  Meds reviewed.                      Physical Exam:   Blood pressure 136/81, pulse 67, temperature 98.3  F (36.8  C), temperature source Oral, resp. rate 16, height 1.6 m (5' 3\"), weight 56.7 kg (125 lb), SpO2 96 %.  Temperature Temp  Av.4  F (36.9  C)  Min: 98.3  F (36.8  C)  Max: 98.5  F (36.9  C)   I/O last 3 completed shifts:  In: 200 [P.O.:200]  Out: 13 [Drains:13]  Constitutional:  Awake, alert, oriented, and in no apparent distress.   Lungs: No increased work of breathing, good air exchange, clear to auscultation bilaterally, and no crackles or wheezing.   Cardiovascular: Regular rate and rhythm, normal S1 and S2, and no murmur noted.   Abdomen: Soft, non-distended, appropriately tender at incision(s). Area of ecchymosis at right abdomen likely from subcutaneous heparin.   Wound(s): Clean, dry, and intact. No erythema or drainage.    Extremities: No edema or calf tenderness. +SCDs          Data:     Recent Labs   Lab Test 10/14/20  0305 10/13/20  0804 10/12/20  0725 10/10/20  0812 10/09/20  1148   WBC  --   --  6.9 9.1 13.4*   HGB  --  14.4 14.1 13.7 15.2   HCT  --   --  42.3 41.2 44.5    271 239 197 246      Lubna Treadwell PA-C  [facility]  [phone]  "

## 2020-10-16 DIAGNOSIS — G89.18 ACUTE POST-OPERATIVE PAIN: ICD-10-CM

## 2020-10-16 RX ORDER — OXYCODONE HYDROCHLORIDE 5 MG/1
5 TABLET ORAL EVERY 6 HOURS PRN
Qty: 10 TABLET | Refills: 0 | Status: SHIPPED | OUTPATIENT
Start: 2020-10-16 | End: 2020-11-19

## 2020-10-16 NOTE — TELEPHONE ENCOUNTER
Procedure:  Laparoscopic appendectomy    Date:  10/09/2020    Surgeon:  Jose    Patient's  calling requesting refill of pain medications.    Sent him with #12 tablets at discharge.  Does have some remaining, but will run out soon and not make it through the weekend.    Her pain is not getting worse, but still present, especially at night.    No n/v or fever.  Diarrhea (on abx).    She has been taking tylenol, but reports this is not helping much.  Has not been taking ibuprofen.    Encouraged tylenol and ibuprofen on a regular, scheduled basis, and do take the oxycodone for any break through pain.    Will call when rx has been sent to pharmacy.    Jolly Dang, RN-BSN

## 2020-10-16 NOTE — TELEPHONE ENCOUNTER
Name of caller: spouse    Reason for Call:  Refill of pain medication    Patient still experiencing a fair amount of pain and only has 2 pain pills left. Hoping to get a refill before the weekend.    oxyCODONE (ROXICODONE) 5 MG tablet    Surgeon:  Dr. Garcia     Recent Surgery:  Yes.    If yes, when & what type:  10/9/20    LAPAROSCOPIC APPENDECTOMY      Best phone number to reach pt at is: [phone]    Ok to leave a message with medical info? Yes.    Pharmacy preferred (if calling for a refill): [facility] [id number] [place] [facility] [address]

## 2020-10-29 ENCOUNTER — OFFICE VISIT (OUTPATIENT)
Dept: SURGERY | Facility: CLINIC | Age: 75
End: 2020-10-29
Payer: COMMERCIAL

## 2020-10-29 DIAGNOSIS — Z09 SURGICAL FOLLOWUP VISIT: Primary | ICD-10-CM

## 2020-10-29 PROCEDURE — 99024 POSTOP FOLLOW-UP VISIT: CPT | Performed by: SURGERY

## 2020-10-29 NOTE — PROGRESS NOTES
Patient returns in follow-up after recent laparoscopic appendectomy for perforated appendicitis.  She continues to feel somewhat limited.  She has discomfort in the suprapubic area as well as across her abdomen.  This has been present particularly when bending over.  She has had diarrhea.  She describes low-grade subjective fevers at home.  She stopped her antibiotics approximately 5 days ago.  No spiking temperatures.  No nausea or vomiting.    On examination: Her incisions are clean dry and intact.  Abdomen is soft and nondistended.  No evidence of infection.    Overall she seems to be making reasonable progress.  She had some concerns about the pulmonary nodule seen on her imaging.  She is a low risk patient having never been a smoker and radiologic recommendations would be for no routine follow-up.  I offered follow-up in 1 year if she so desired.  She was noted to have an inguinal hernia at the time of her surgery.  She has been having some pain in the right inguinal area this may be related to the hernia itself.  I see no indication for imaging at this time.  I would like to see her back in approximately 2 to 3 weeks.

## 2020-11-19 ENCOUNTER — OFFICE VISIT (OUTPATIENT)
Dept: SURGERY | Facility: CLINIC | Age: 75
End: 2020-11-19
Payer: COMMERCIAL

## 2020-11-19 DIAGNOSIS — Z09 SURGICAL FOLLOWUP VISIT: Primary | ICD-10-CM

## 2020-11-19 PROCEDURE — 99024 POSTOP FOLLOW-UP VISIT: CPT | Performed by: SURGERY

## 2020-11-19 NOTE — PROGRESS NOTES
Patient returns in ongoing follow-up after laparoscopic appendectomy for perforated appendicitis.  Since the time of her last visit it sounds as though most things have improved.  She is no longer having the sensation of fevers.  Diarrhea has stopped.  She still has some occasional discomfort at her incision sites with activity and exercise.  She has returned to her exercise routine.  Primary concern is that of possible infection at her left lower quadrant port site.    On examination: Abdomen is soft and nondistended.  Incision sites are healed well.  She is spitting the stitch at the left lower quadrant site.  The stitch and knot were removed.  She was reassured that this will resolve with time.    Overall she is doing well.  We discussed the ongoing signs and symptoms to watch out for in terms of symptomatic gallstones as well as symptomatic hernia.  She is going to monitor for these.  If she has issues she was encouraged to come back or call at any time.    Please route or send letter to:  Primary Care Provider (PCP)

## 2021-03-03 ENCOUNTER — IMMUNIZATION (OUTPATIENT)
Dept: NURSING | Facility: CLINIC | Age: 76
End: 2021-03-03
Payer: COMMERCIAL

## 2021-03-03 PROCEDURE — 91300 PR COVID VAC PFIZER DIL RECON 30 MCG/0.3 ML IM: CPT

## 2021-03-03 PROCEDURE — 0001A PR COVID VAC PFIZER DIL RECON 30 MCG/0.3 ML IM: CPT

## 2021-03-21 ENCOUNTER — HEALTH MAINTENANCE LETTER (OUTPATIENT)
Age: 76
End: 2021-03-21

## 2021-03-24 ENCOUNTER — IMMUNIZATION (OUTPATIENT)
Dept: NURSING | Facility: CLINIC | Age: 76
End: 2021-03-24
Attending: INTERNAL MEDICINE
Payer: COMMERCIAL

## 2021-03-24 PROCEDURE — 91300 PR COVID VAC PFIZER DIL RECON 30 MCG/0.3 ML IM: CPT

## 2021-03-24 PROCEDURE — 0002A PR COVID VAC PFIZER DIL RECON 30 MCG/0.3 ML IM: CPT

## 2021-07-16 ENCOUNTER — HOSPITAL ENCOUNTER (EMERGENCY)
Facility: CLINIC | Age: 76
Discharge: HOME | End: 2021-07-17
Attending: EMERGENCY MEDICINE
Payer: COMMERCIAL

## 2021-07-16 VITALS
SYSTOLIC BLOOD PRESSURE: 175 MMHG | HEART RATE: 89 BPM | HEIGHT: 64 IN | OXYGEN SATURATION: 99 % | TEMPERATURE: 98.5 F | RESPIRATION RATE: 16 BRPM | BODY MASS INDEX: 21.34 KG/M2 | DIASTOLIC BLOOD PRESSURE: 77 MMHG | WEIGHT: 125 LBS

## 2021-07-16 DIAGNOSIS — E87.6 HYPOKALEMIA: ICD-10-CM

## 2021-07-16 DIAGNOSIS — R10.84 ABDOMINAL PAIN, GENERALIZED: ICD-10-CM

## 2021-07-16 DIAGNOSIS — R19.7 DIARRHEA, UNSPECIFIED TYPE: Primary | ICD-10-CM

## 2021-07-16 LAB
BASOPHILS # BLD AUTO: 0 10E3/UL (ref 0–0.2)
BASOPHILS NFR BLD AUTO: 0 %
EOSINOPHIL # BLD AUTO: 0.2 10E3/UL (ref 0–0.7)
EOSINOPHIL NFR BLD AUTO: 3 %
ERYTHROCYTE [DISTWIDTH] IN BLOOD BY AUTOMATED COUNT: 13 % (ref 10–15)
HCT VFR BLD AUTO: 42 % (ref 35–47)
HGB BLD-MCNC: 13.7 G/DL (ref 11.7–15.7)
IMM GRANULOCYTES # BLD: 0 10E3/UL
IMM GRANULOCYTES NFR BLD: 0 %
LYMPHOCYTES # BLD AUTO: 1.6 10E3/UL (ref 0.8–5.3)
LYMPHOCYTES NFR BLD AUTO: 25 %
MCH RBC QN AUTO: 29.7 PG (ref 26.5–33)
MCHC RBC AUTO-ENTMCNC: 32.6 G/DL (ref 31.5–36.5)
MCV RBC AUTO: 91 FL (ref 78–100)
MONOCYTES # BLD AUTO: 0.4 10E3/UL (ref 0–1.3)
MONOCYTES NFR BLD AUTO: 7 %
NEUTROPHILS # BLD AUTO: 4.1 10E3/UL (ref 1.6–8.3)
NEUTROPHILS NFR BLD AUTO: 65 %
NRBC # BLD AUTO: 0 10E3/UL
NRBC BLD AUTO-RTO: 0 /100
PLATELET # BLD AUTO: 203 10E3/UL (ref 150–450)
RBC # BLD AUTO: 4.61 10E6/UL (ref 3.8–5.2)
RBC MORPH BLD: ABNORMAL
TOXIC GRANULES BLD QL SMEAR: PRESENT
WBC # BLD AUTO: 6.3 10E3/UL (ref 4–11)

## 2021-07-16 PROCEDURE — 85025 COMPLETE CBC W/AUTO DIFF WBC: CPT | Performed by: EMERGENCY MEDICINE

## 2021-07-16 PROCEDURE — 36592 COLLECT BLOOD FROM PICC: CPT | Performed by: EMERGENCY MEDICINE

## 2021-07-16 PROCEDURE — 36415 COLL VENOUS BLD VENIPUNCTURE: CPT | Mod: 91 | Performed by: EMERGENCY MEDICINE

## 2021-07-16 PROCEDURE — 99283 EMERGENCY DEPT VISIT LOW MDM: CPT

## 2021-07-16 ASSESSMENT — MIFFLIN-ST. JEOR: SCORE: 1052

## 2021-07-16 NOTE — ED TRIAGE NOTES
Patient complaining of abdominal pain. States she had C-Diff, finished her abx and it improved.  States she ate something spicy today and that aggravated her abdominal pain.

## 2021-07-17 ENCOUNTER — HOSPITAL ENCOUNTER (EMERGENCY)
Facility: CLINIC | Age: 76
Discharge: HOME | End: 2021-07-17
Attending: EMERGENCY MEDICINE | Admitting: EMERGENCY MEDICINE
Payer: COMMERCIAL

## 2021-07-17 ENCOUNTER — LAB (OUTPATIENT)
Dept: LAB | Facility: CLINIC | Age: 76
End: 2021-07-17
Attending: EMERGENCY MEDICINE
Payer: COMMERCIAL

## 2021-07-17 ENCOUNTER — TELEPHONE (OUTPATIENT)
Dept: EMERGENCY MEDICINE | Facility: CLINIC | Age: 76
End: 2021-07-17

## 2021-07-17 VITALS
OXYGEN SATURATION: 95 % | RESPIRATION RATE: 16 BRPM | SYSTOLIC BLOOD PRESSURE: 125 MMHG | HEART RATE: 85 BPM | DIASTOLIC BLOOD PRESSURE: 67 MMHG | TEMPERATURE: 98.2 F | BODY MASS INDEX: 21.46 KG/M2 | HEIGHT: 64 IN

## 2021-07-17 DIAGNOSIS — R19.7 DIARRHEA, UNSPECIFIED TYPE: ICD-10-CM

## 2021-07-17 LAB
ALBUMIN SERPL-MCNC: 3.9 G/DL (ref 3.4–5)
ALP SERPL-CCNC: 74 U/L (ref 40–150)
ALT SERPL W P-5'-P-CCNC: 28 U/L (ref 0–50)
ANION GAP SERPL CALCULATED.3IONS-SCNC: 3 MMOL/L (ref 3–14)
AST SERPL W P-5'-P-CCNC: 19 U/L (ref 0–45)
BILIRUB SERPL-MCNC: 0.6 MG/DL (ref 0.2–1.3)
BUN SERPL-MCNC: 18 MG/DL (ref 7–30)
C DIFF TOX B STL QL: POSITIVE
CALCIUM SERPL-MCNC: 9.3 MG/DL (ref 8.5–10.1)
CHLORIDE BLD-SCNC: 106 MMOL/L (ref 94–109)
CO2 SERPL-SCNC: 28 MMOL/L (ref 20–32)
CREAT SERPL-MCNC: 0.82 MG/DL (ref 0.52–1.04)
GFR SERPL CREATININE-BSD FRML MDRD: 71 ML/MIN/1.73M2
GLUCOSE BLD-MCNC: 95 MG/DL (ref 70–99)
LIPASE SERPL-CCNC: 316 U/L (ref 73–393)
POTASSIUM BLD-SCNC: 3.3 MMOL/L (ref 3.4–5.3)
PROT SERPL-MCNC: 7.7 G/DL (ref 6.8–8.8)
SODIUM SERPL-SCNC: 137 MMOL/L (ref 133–144)

## 2021-07-17 PROCEDURE — 250N000011 HC RX IP 250 OP 636: Performed by: EMERGENCY MEDICINE

## 2021-07-17 PROCEDURE — 96361 HYDRATE IV INFUSION ADD-ON: CPT

## 2021-07-17 PROCEDURE — 250N000013 HC RX MED GY IP 250 OP 250 PS 637: Performed by: EMERGENCY MEDICINE

## 2021-07-17 PROCEDURE — 36415 COLL VENOUS BLD VENIPUNCTURE: CPT | Performed by: EMERGENCY MEDICINE

## 2021-07-17 PROCEDURE — 87506 IADNA-DNA/RNA PROBE TQ 6-11: CPT

## 2021-07-17 PROCEDURE — 87493 C DIFF AMPLIFIED PROBE: CPT

## 2021-07-17 PROCEDURE — 83690 ASSAY OF LIPASE: CPT | Performed by: EMERGENCY MEDICINE

## 2021-07-17 PROCEDURE — 36592 COLLECT BLOOD FROM PICC: CPT | Performed by: EMERGENCY MEDICINE

## 2021-07-17 PROCEDURE — 99284 EMERGENCY DEPT VISIT MOD MDM: CPT | Mod: 25

## 2021-07-17 PROCEDURE — 80053 COMPREHEN METABOLIC PANEL: CPT | Performed by: EMERGENCY MEDICINE

## 2021-07-17 PROCEDURE — 96374 THER/PROPH/DIAG INJ IV PUSH: CPT

## 2021-07-17 PROCEDURE — 258N000003 HC RX IP 258 OP 636: Performed by: EMERGENCY MEDICINE

## 2021-07-17 RX ORDER — VANCOMYCIN HYDROCHLORIDE 125 MG/1
CAPSULE ORAL
Qty: 140 CAPSULE | Refills: 0 | Status: SHIPPED | OUTPATIENT
Start: 2021-07-17 | End: 2021-09-11

## 2021-07-17 RX ORDER — POTASSIUM CHLORIDE 1500 MG/1
20 TABLET, EXTENDED RELEASE ORAL ONCE
Status: COMPLETED | OUTPATIENT
Start: 2021-07-17 | End: 2021-07-17

## 2021-07-17 RX ORDER — ONDANSETRON 4 MG/1
4 TABLET, ORALLY DISINTEGRATING ORAL EVERY 6 HOURS PRN
Qty: 10 TABLET | Refills: 0 | Status: SHIPPED | OUTPATIENT
Start: 2021-07-17 | End: 2021-07-20

## 2021-07-17 RX ORDER — ACETAMINOPHEN 500 MG
1000 TABLET ORAL ONCE
Status: COMPLETED | OUTPATIENT
Start: 2021-07-17 | End: 2021-07-17

## 2021-07-17 RX ORDER — ONDANSETRON 2 MG/ML
4 INJECTION INTRAMUSCULAR; INTRAVENOUS ONCE
Status: COMPLETED | OUTPATIENT
Start: 2021-07-17 | End: 2021-07-17

## 2021-07-17 RX ADMIN — POTASSIUM CHLORIDE 20 MEQ: 1500 TABLET, EXTENDED RELEASE ORAL at 01:11

## 2021-07-17 RX ADMIN — ACETAMINOPHEN 1000 MG: 500 TABLET, FILM COATED ORAL at 15:13

## 2021-07-17 RX ADMIN — SODIUM CHLORIDE 1000 ML: 9 INJECTION, SOLUTION INTRAVENOUS at 12:40

## 2021-07-17 RX ADMIN — ONDANSETRON 4 MG: 2 INJECTION INTRAMUSCULAR; INTRAVENOUS at 12:42

## 2021-07-17 ASSESSMENT — ENCOUNTER SYMPTOMS
ABDOMINAL PAIN: 1
DIARRHEA: 1
BACK PAIN: 1
VOMITING: 0
DIARRHEA: 1
FEVER: 0
FEVER: 1
MYALGIAS: 1
ABDOMINAL PAIN: 1
CHILLS: 1

## 2021-07-17 NOTE — TELEPHONE ENCOUNTER
[facility] Emergency Department/Urgent Care Lab result notification:    [facility] ED lab result protocol used  C diff protocol    Reason for call  Notify of lab results, assess symptoms,  review ED providers recommendations/discharge instructions (if necessary) and advise per ED lab result f/u protocol    Lab Result   Component      Latest Ref Rng & Units 7/17/2021   C Difficile Toxin B by PCR      Negative Positive (A)     Final Clostridium difficile toxin B PCR is POSITIVE.    [facility] Emergency Dept discharge antibiotic: Vancomycin (VANCOCIN HCL) 125 MG capsule, Take 1 capsule (125 mg) by mouth 4 times daily for 14 days, THEN 1 capsule (125 mg) 3 times daily for 14 days, THEN 1 capsule (125 mg) 2 times daily for 14 days, THEN 1 capsule (125 mg) daily for 14 days.  Recommendations in treatment per [facility] ED Lab result Clostridium difficile protocol.    Information table from Emergency Dept Provider visit on 7/17/21  Symptoms reported at ED visit (Chief complaint, HPI) Chief Complaint:  Diarrhea     HPI   Winston Mcleod is a 76 year old female with history of hypertension, breast cancer, gastroesophageal reflux disease, appendicitis, and C-diff infection who presents with abdominal pain and diarrhea. The patient was diagnosed with C-diff on 6/30 after being on clindamycin for a sinus infection. She recently finished a 10 day course of vancomycin on 7/10/2021. Her symptoms initially resolved but she developed recurrent abdominal pain yesterday that was exacerbated by eating spicy food and went to the ED. While in the ED she was given potassium replacement and sent home with a stool kit to test for C difficile and enteric bacteria and virus panel.      Here in the ED, the patient reports worsening abdominal pain and diarrhea since her visit yesterday. She notes going to bed last night and feeling shaky and hot. She took Advil PM. Her temperature today was 102 F. She also notes myalgias,  back pain, and nausea. She states these symptoms are similar to when she had C-diff. She submitted her stool kit this morning and the results are still pending. Of note she has had 3 small bowel movements per day.   ED providers Impression and Plan (applicable information) No incomplete   Miscellaneous information AVS instructions  Return to the emergency department or seek medical care as instructed  if your symptoms fail to improve or significantly worsen.  Take Acetaminophen (aka Tylenol) as needed for symptom/pain relief;  use as directed.  Take antibiotics as prescribed; complete entire course as directed.  Zofran as needed for nausea  Follow-up as indicated on page 1.  Maintain adequate hydration and get plenty of rest.    Follow up with Noni Lugo MD in 1 week (around 7/24/2021)    Read attached information :  About C. Diff Infection: For Patients, Family and Visitors (English)    START taking:  ondansetron (Zofran ODT)  vancomycin (VANCOCIN)  Start taking on: July 17, 2021     RN Assessment (Patient s current Symptoms), include time called.  [Insert Left message here if message left]  At 4:45P,   Left voicemail message requesting a call back to [facility] ED Lab Result RN at [phone].  RN is available every day between 9 a.m. and 5:30 p.m.     David Abdul RN  [facility]  Emergency Dept Lab Result RN  Ph# [phone]

## 2021-07-17 NOTE — ED NOTES
Pt refuses protocols in triage- reports having blood done twice yesterday and does not want them again

## 2021-07-17 NOTE — RESULT ENCOUNTER NOTE
Final Clostridium difficile toxin B PCR is POSITIVE.    [facility] Emergency Dept discharge antibiotic: Vancomycin (VANCOCIN HCL) 125 MG capsule, Take 1 capsule (125 mg) by mouth 4 times daily for 14 days, THEN 1 capsule (125 mg) 3 times daily for 14 days, THEN 1 capsule (125 mg) 2 times daily for 14 days, THEN 1 capsule (125 mg) daily for 14 days.  Recommendations in treatment per [facility] ED Lab result Clostridium difficile protocol.

## 2021-07-17 NOTE — ED TRIAGE NOTES
Pt reports diarrhea, temp of 102 this am and body aches- was seen here yesterday for same symptoms

## 2021-07-17 NOTE — DISCHARGE INSTRUCTIONS
Return to the emergency department or seek medical care as instructed if your symptoms fail to improve or significantly worsen.    Take Acetaminophen (aka Tylenol) as needed for symptom/pain relief; use as directed.    Take antibiotics as prescribed; complete entire course as directed.    Zofran as needed for nausea    Follow-up as indicated on page 1.  Maintain adequate hydration and get plenty of rest.

## 2021-07-17 NOTE — ED PROVIDER NOTES
"  History   Chief Complaint:  Abdominal Pain       HPI   Winston Mcleod is a 76 year old female with history of hypertension, GERD, and appendicitis who presents with abdominal pain. Patient was diagnosed with C-diff on June 30th and recently finished her course of vancomycin 6 days ago and her symptoms were improved. Today, she has been having abdominal pain and states she ate something spicy that aggravated her abdominal pain. She has also had multiple episodes of diarrhea which started yesterday. These symptoms feel the same as what she had when she was diagnosed with C-diff. She denies any fever or vomit.     Review of Systems   Constitutional: Negative for fever.   Gastrointestinal: Positive for abdominal pain and diarrhea. Negative for vomiting.   All other systems reviewed and are negative.    Allergies:  Atorvastatin     Medications:    Zovirax  Xanax  Imodium  Flexeril  Decadron  Neurontin  Prilosec  Inderal  Sonata     Past Medical History:    TMJ  Hypertension  Herpes  Breast cancer  GERD  Appendicitis      Past Surgical History:    Breast biopsy      Social History:  Presents with  at bedside  Tobacco use: Never    Physical Exam     Patient Vitals for the past 24 hrs:   BP Temp Temp src Pulse Resp SpO2 Height Weight   07/16/21 1806 (!) 175/77 98.5  F (36.9  C) Temporal 89 16 99 % 1.626 m (5' 4\") 56.7 kg (125 lb)       Physical Exam  General: Sitting up in bed  Eyes:  The pupils are equal and round    Conjunctivae and sclerae are normal  ENT:    Wearing a mask  Neck:  Normal range of motion  CV:  Regular rate, regular rhythm     Skin warm and well perfused   Resp:  Non labored breathing on room air    No tachypnea    No cough heard    Lungs clear bilaterally  GI:  Abdomen is soft, there is no rigidity    No distension    No rebound tenderness     No abdominal tenderness    No focal abdominal tenderness  MS:  Normal muscular tone  Skin:  No rash or acute skin lesions noted  Neuro:   Awake, alert.  "     Speech is normal and fluent.    Face is symmetric.     Moves all extremities equally  Psych: Normal affect.  Appropriate interactions.      Emergency Department Course     Laboratory:  CBC: WBC: 6.3, HGB: 13.7, PLT: 203  Lipase: 316  CMP: potasium 3.3 (low) o/w WNL (Creatinine 0.82)  Clostridium Difficile Toxin B: pending  Enteric bacteria and virus panel by [facility] stool: pending     Emergency Department Course:    Reviewed:  nursing notes, vitals, past medical history and care everywhere    Assessments:    2350 Initial assessment     0108 I rechecked the patient and discussed the results of their workup thus far.     Interventions:  Klor-Con 20 mEq PO    Disposition:  The patient was discharged to home.       Impression & Plan     Medical Decision Making:  Winston Mcleod is a 76 year old female who presented to the ED with diarrhea. Patient reports having same symptoms as to when she was diagnosed with c difficile. Having the loose stools again started yesterday when previously they had resolved with oral vancomycin. No focal abdominal tenderness on exam. Mild hypokalemia but rest of labs are unremarkable. Given that having diarrhea again and reports symptoms same as prior c difficile, stool culture/c difficile ordered but not able to be collected in ED. Sent home with kit. Discussed that there is chance that she could still have positive c difficile but now having reoccurrence of symptoms so suspicion is higher that she could need to be treated again. No focal tenderness so do not think abdominal imaging indicated. Doubt cholecystitis, cholangitis, obstruction, diverticulitis, UTI, etc. Discussed that will receive a call from nurse if labs are abnormal.    Diagnosis:    ICD-10-CM    1. Diarrhea, unspecified type  R19.7 Clostridium difficile toxin B PCR     Enteric Bacteria and Virus Panel by [facility] Stool   2. Hypokalemia  E87.6    3. Abdominal pain, generalized  R10.84      Scribe Disclosure:  I, Yo Thomas, am  serving as a scribe at 11:44 PM on 7/16/2021 to document services personally performed by Celia Osorio MD based on my observations and the provider's statements to me.              Celia Osorio MD  07/17/21 0134

## 2021-07-17 NOTE — ED PROVIDER NOTES
History   Chief Complaint:  Diarrhea    HPI   Winston Mcleod is a 76 year old female with history of hypertension, breast cancer, gastroesophageal reflux disease, appendicitis, and C-diff infection who presents with abdominal pain and diarrhea. The patient was diagnosed with C-diff on 6/30 after being on clindamycin for a sinus infection. She recently finished a 10 day course of vancomycin on 7/10/2021. Her symptoms initially resolved but she developed recurrent abdominal pain yesterday that was exacerbated by eating spicy food and went to the ED. While in the ED she was given potassium replacement and sent home with a stool kit to test for C difficile and enteric bacteria and virus panel.     Here in the ED, the patient reports worsening abdominal pain and diarrhea since her visit yesterday. She notes going to bed last night and feeling shaky and hot. She took Advil PM. Her temperature today was 102 F. She also notes myalgias, back pain, and nausea. She states these symptoms are similar to when she had C-diff. She submitted her stool kit this morning and the results are still pending. Of note she has had 3 small bowel movements per day.    Laboratory from 7/16/2021 ED visit  CBC: WBC: 6.3, HGB: 13.7, PLT: 203  Lipase: 316  CMP: potasium 3.3 (low) o/w WNL (Creatinine 0.82)  Clostridium Difficile Toxin B: pending  Enteric bacteria and virus panel by [facility] stool: pending    Review of Systems   Constitutional: Positive for chills and fever.   Gastrointestinal: Positive for abdominal pain and diarrhea.   Musculoskeletal: Positive for back pain and myalgias.   All other systems reviewed and are negative.    Allergies:  Atorvastatin     Medications:    Zovirax  Xanax  Imodium  Flexeril  Decadron  Neurontin  Prilosec  Inderal  Sonata     Past Medical History:    TMJ  Hypertension  Herpes  Breast cancer  Gastroesophageal reflux disease  Appendicitis     Social History:  Presents with  at bedside  Tobacco  "use: Never    Physical Exam     Patient Vitals for the past 24 hrs:   BP Temp Temp src Pulse Resp SpO2 Height   07/17/21 1330 125/67 -- -- 85 -- 95 % --   07/17/21 1300 126/69 -- -- 89 -- 96 % --   07/17/21 1245 127/73 -- -- 89 -- 95 % --   07/17/21 1112 (!) 172/79 98.2  F (36.8  C) Temporal 108 16 99 % 1.626 m (5' 4\")       Physical Exam  General: Alert and cooperative with exam. Patient in mild distress. Normal mentation. Non toxic appearance.  Head:  Scalp is NC/AT  Eyes:  No scleral icterus, PERRL  ENT:  The external nose and ears are normal. The oropharynx is normal and without erythema; mucus membranes are moist.   Neck:  Normal range of motion without rigidity.  CV:  Regular rate and rhythm    No pathologic murmur   Resp:  Breath sounds are clear bilaterally    Non-labored, no retractions or accessory muscle use  GI:  Abdomen is soft, no distension, no tenderness. No peritoneal signs  MS:  No lower extremity edema   Skin:  Warm and dry, No rash or lesions noted.  Neuro: Oriented x 3. No gross motor deficits.        Emergency Department Course   Emergency Department Course:    Reviewed:    I reviewed the patient's nursing notes, vitals, past medical records, Care Everywhere.     Assessments:    1200: I performed an exam of the patient and obtained history, as documented above.    I rechecked the patient and updated them on findings. They are amenable to discharge.     Consults:  1416: I spoke with Infectious Diseases Dr. Lugo regarding the patient.    Interventions:  1240: NS 1L IV Bolus   1242: Zofran 4 mg IV  1510: Tylenol 1000 mg PO    Disposition:  The patient was discharged to home.     Impression & Plan   Medical Decision Making:  Patient is a 76-year-old female who presents with intermittent crampy abdominal pain, fever this morning, and diarrhea; recently completed a course of treatment for C. difficile.  Patient's medical history and records were reviewed.  Patient was evaluated yesterday in the " emergency department and stool studies are currently pending.  Labs yesterday without significant findings and there is no emergent indication for repeat labs at this time.  Patient was provided IV fluids, Zofran, and Tylenol with noted improvement in symptoms.  She notes that her symptoms are identical to previous C. difficile infection.  Elected to treat patient empirically for C. difficile.  Case was discussed with Dr. Lugo of infectious disease who recommended 8-week taper of oral vancomycin.  Patient will follow up closely with infectious disease clinic to ensure resolution.  At this time I feel patient is safe and appropriate for continued outpatient management.  Her abdominal exam is benign and she is tolerating oral intake.  Return precautions were discussed.  Patient discharged home.      Diagnosis:    ICD-10-CM    1. Diarrhea, unspecified type  R19.7        Discharge Medications:  Discharge Medication List as of 7/17/2021  3:14 PM      START taking these medications    Details   ondansetron (ZOFRAN ODT) 4 MG ODT tab Take 1 tablet (4 mg) by mouth every 6 hours as needed for nausea or vomiting, Disp-10 tablet, R-0, E-Prescribe      vancomycin (VANCOCIN) 125 MG capsule Take 1 capsule (125 mg) by mouth 4 times daily for 14 days, THEN 1 capsule (125 mg) 3 times daily for 14 days, THEN 1 capsule (125 mg) 2 times daily for 14 days, THEN 1 capsule (125 mg) daily for 14 days., Disp-140 capsule, R-0, E-Prescribe             Scribe Disclosure:  Kelli PALACIO, am serving as a scribe at 12:18 PM on 7/17/2021 to document services personally performed by Wilmer Watt DO based on my observations and the provider's statements to me.        Wilmer Watt DO  07/17/21 5447

## 2021-07-17 NOTE — RESULT ENCOUNTER NOTE
Left voicemail message requesting a call back to [facility] ED Lab Result RN at [phone].  RN is available every day between 9 a.m. and 5:30 p.m.  See Telephone encounter.

## 2021-07-18 NOTE — RESULT ENCOUNTER NOTE
Final Enteric Bacteria and Virus Panel by [facility] Stool is NEGATIVE for Campylobacter group, Salmonella species, Shigella species, Shiga toxin 1 gene, Shiga toxin 2 gene, Vibrio group,  Yersinia enterocolitica,  Rotavirus A,  and Norovirus I and II.  No treatment or change in treatment per [facility] Lab Result Enteric Bacteria and Virus Panel protocol.

## 2021-07-20 NOTE — TELEPHONE ENCOUNTER
Patient has reviewed result in [facility]  Unable to reach via phone.  Attempts made on 3 separate days.    David Abdul RN  [facility]  Emergency Dept Lab Result RN  Ph# [phone]

## 2021-09-04 ENCOUNTER — HEALTH MAINTENANCE LETTER (OUTPATIENT)
Age: 76
End: 2021-09-04

## 2021-12-01 ENCOUNTER — LAB REQUISITION (OUTPATIENT)
Dept: LAB | Facility: CLINIC | Age: 76
End: 2021-12-01
Payer: COMMERCIAL

## 2021-12-01 DIAGNOSIS — J34.89 OTHER SPECIFIED DISORDERS OF NOSE AND NASAL SINUSES: ICD-10-CM

## 2021-12-01 PROCEDURE — 87070 CULTURE OTHR SPECIMN AEROBIC: CPT | Mod: ORL | Performed by: OTOLARYNGOLOGY

## 2021-12-03 LAB — BACTERIA SPEC CULT: NO GROWTH

## 2022-03-07 ENCOUNTER — HOSPITAL ENCOUNTER (EMERGENCY)
Facility: CLINIC | Age: 77
Discharge: HOME | End: 2022-03-07
Attending: EMERGENCY MEDICINE | Admitting: EMERGENCY MEDICINE
Payer: COMMERCIAL

## 2022-03-07 VITALS
WEIGHT: 125 LBS | SYSTOLIC BLOOD PRESSURE: 191 MMHG | HEART RATE: 75 BPM | HEIGHT: 64 IN | OXYGEN SATURATION: 98 % | RESPIRATION RATE: 18 BRPM | BODY MASS INDEX: 21.34 KG/M2 | DIASTOLIC BLOOD PRESSURE: 89 MMHG | TEMPERATURE: 98 F

## 2022-03-07 DIAGNOSIS — F41.9 ANXIETY: ICD-10-CM

## 2022-03-07 DIAGNOSIS — K13.0 LIP PAIN: ICD-10-CM

## 2022-03-07 PROCEDURE — 99283 EMERGENCY DEPT VISIT LOW MDM: CPT

## 2022-03-07 RX ORDER — VALACYCLOVIR HYDROCHLORIDE 1 G/1
2000 TABLET, FILM COATED ORAL 2 TIMES DAILY
Qty: 4 TABLET | Refills: 0 | Status: SHIPPED | OUTPATIENT
Start: 2022-03-07 | End: 2022-03-08

## 2022-03-07 RX ORDER — HYDROXYZINE HYDROCHLORIDE 25 MG/1
25 TABLET, FILM COATED ORAL 3 TIMES DAILY PRN
Qty: 12 TABLET | Refills: 0 | Status: SHIPPED | OUTPATIENT
Start: 2022-03-07

## 2022-03-07 ASSESSMENT — ENCOUNTER SYMPTOMS
CHILLS: 0
SORE THROAT: 0
HEADACHES: 0

## 2022-03-07 NOTE — ED TRIAGE NOTES
Pt has had cold sore now for 2 weeks and is worried it is something else because she is very anxious. Has family in [facility]. Has tried zovirax, abreva, etc.

## 2022-03-07 NOTE — ED PROVIDER NOTES
"  History   Chief Complaint:  Wound Check     The history is provided by the patient and medical records.      Winston Mcleod is a 77 year old female with history of breast cancer, hypertension, hypercholesteremia, and GERD who presents for a wound check. The patient states she has had a cold sore for the past two weeks. She states she has been taking 400 mg Zovirax twice a day without improvement of symptoms. She also has used Abreva without improvement of symptoms. She denies drainage or yellow crusting. She denies otalgia or sore throat. She notes she has been more stressed and anxious recently due to having friends in [facility]. She endorses headache and chills.     Review of Systems   Constitutional: Negative for chills.   HENT: Positive for mouth sores. Negative for ear pain and sore throat.    Neurological: Negative for headaches.   All other systems reviewed and are negative.    Allergies:  Atorvastatin     Medications:  Nasacort    Past Medical History:     C difficile diarrhea  GERD  Hypertension  Hypokalemia   Cholelithiasis  Pulmonary nodules  Appendicitis    Anxiety  Hypercholesteremia   Fibrocystic disease of breast   Breast cancer     Past Surgical History:    Appendectomy    Breast biopsy  Lumpectomy, right breast  Cataract extraction with IOL, right  Phacoemulsification with IOL, left    Social History:  The patient was accompanied to the emergency department by her .    Physical Exam     Patient Vitals for the past 24 hrs:   BP Temp Temp src Pulse Resp SpO2 Height Weight   03/07/22 1438 (!) 191/89 98  F (36.7  C) Temporal 75 18 98 % 1.626 m (5' 4\") 56.7 kg (125 lb)     Physical Exam  Physical Exam   General:  Sitting on bed with  at bedside, comfortable appearing.   HENT:  No obvious trauma to head. On the upper mid lip there is a faint small vesicular lesions present in midline.   Right Ear:  External ear normal.   Left Ear:  External ear normal.   Nose:  Nose normal.   Eyes:  " Conjunctivae and EOM are normal.  Neck: Normal range of motion. Neck supple. No tracheal deviation present.   Pulm/Chest: No respiratory distress  M/S: Normal range of motion.   Neuro: Alert. GCS 15.  No meningeal signs.  Skin: Skin is warm and dry. No rash noted. Not diaphoretic.   Psych: Normal mood and affect. Behavior is normal.     Emergency Department Course   Emergency Department Course:     Reviewed:  I reviewed nursing notes, vitals, past medical history and Care Everywhere    Assessments:  1531 I obtained history and examined the patient as noted above.   1613 The patient has had anxiety and difficulty sleeping because of this and is requesting an anxiolytic medication.     Disposition:  The patient was discharged to home.     Impression & Plan   Medical Decision Making:  Winston Mcleod is a very pleasant 77 year old female who presents for evaluation of a wound on the upper mid lip.  The most likely etiology of the wound is cold sores based upon her history and description of what the lesions originally looked like.  She still has some tingling and discomfort despite the acyclovir and was interested in trying a different antiviral.  I discussed it is likely showing signs of healing and may not be necessary for additional antivirals, which he strongly desired to try a different antiviral.  Valtrex prescribed.  No signs of impetigo.  Does not appear to be shingles.  I also recommended follow-up with dermatology as this could be the very early stages of a basal cell carcinoma or similar.  There are no signs of infection at this point and would not start antibiotics.  No signs of lymphadenitis, lymphangitis, necrotizing fascitis, osteomyelitis, abscess, cellulitis, etc. of note, the patient reports that she has been very anxious with everything happening ([place] war) well today as she has family in [facility].  The patient was requesting a medicine to help with anxiety to help her feel better.   Hydroxyzine prescribed.  I discussed this causes sedation and she should not drive or operate heavy machinery while on this. The patients blood pressure was noted to be elevated. I reviewed with this with the patient and discussed that uncontrolled or inadequately controlled blood pressure can lead to heart attaches, strokes, dialysis and death. The pt expressed verbal understanding and agrees to follow up with their PCP.    The treatment plan was discussed with the patient and they expressed understanding of this plan and consented to the plan.  In addition, the patient will return to the emergency department if their symptoms persist, worsen, if new symptoms arise or if there is any concern as other pathology may be present that is not evident at this time. They also understand the importance of close follow up in the clinic and if unable to do so will return to the emergency department for a reevaluation. All questions were answered.    Diagnosis:    ICD-10-CM    1. Lip pain  K13.0    2. Anxiety  F41.9      Discharge Medications:  New Prescriptions    HYDROXYZINE (ATARAX) 25 MG TABLET    Take 1 tablet (25 mg) by mouth 3 times daily as needed for anxiety    VALACYCLOVIR (VALTREX) 1000 MG TABLET    Take 2 tablets (2,000 mg) by mouth 2 times daily for 1 day     Scribe Disclosure:  I, Beth Cisneros, am serving as a scribe at 3:25 PM on 3/7/2022 to document services personally performed by Hernandez Mccartney DO based on my observations and the provider's statements to me.     Hernandez Mccartney DO  03/07/22 5700

## 2022-04-16 ENCOUNTER — HEALTH MAINTENANCE LETTER (OUTPATIENT)
Age: 77
End: 2022-04-16

## 2022-06-06 ENCOUNTER — OFFICE VISIT (OUTPATIENT)
Dept: SURGERY | Facility: CLINIC | Age: 77
End: 2022-06-06
Payer: COMMERCIAL

## 2022-06-06 VITALS — HEIGHT: 64 IN | WEIGHT: 122 LBS | BODY MASS INDEX: 20.83 KG/M2

## 2022-06-06 DIAGNOSIS — K80.20 CALCULUS OF GALLBLADDER WITHOUT CHOLECYSTITIS WITHOUT OBSTRUCTION: Primary | ICD-10-CM

## 2022-06-06 PROCEDURE — 99215 OFFICE O/P EST HI 40 MIN: CPT | Performed by: SURGERY

## 2022-06-06 RX ORDER — ALPRAZOLAM 0.5 MG
TABLET ORAL
COMMUNITY
Start: 2022-05-02

## 2022-06-06 RX ORDER — CYCLOSPORINE 0 G/ML
SOLUTION/ DROPS OPHTHALMIC; TOPICAL
COMMUNITY
Start: 2022-03-27

## 2022-06-06 RX ORDER — ACYCLOVIR 400 MG/1
400 TABLET ORAL
COMMUNITY

## 2022-06-06 RX ORDER — ESCITALOPRAM OXALATE 5 MG/1
5 TABLET ORAL EVERY MORNING
COMMUNITY
Start: 2022-05-31

## 2022-06-06 RX ORDER — AMMONIUM LACTATE 12 G/100G
CREAM TOPICAL
COMMUNITY
Start: 2021-12-14

## 2022-06-06 RX ORDER — IBUPROFEN 200 MG
1 CAPSULE ORAL
COMMUNITY

## 2022-06-06 RX ORDER — CITALOPRAM HYDROBROMIDE 20 MG/1
20 TABLET ORAL
COMMUNITY
Start: 2021-09-02

## 2022-06-06 RX ORDER — CYCLOSPORINE 0.5 MG/ML
EMULSION OPHTHALMIC
COMMUNITY
Start: 2021-08-06

## 2022-06-06 RX ORDER — GABAPENTIN 100 MG/1
100 CAPSULE ORAL
COMMUNITY
Start: 2021-07-02

## 2022-06-06 RX ORDER — SIMVASTATIN 10 MG
10 TABLET ORAL AT BEDTIME
COMMUNITY
Start: 2022-05-31

## 2022-06-06 RX ORDER — PROPRANOLOL HYDROCHLORIDE 40 MG/1
40 TABLET ORAL
COMMUNITY
Start: 2021-10-14

## 2022-06-06 RX ORDER — AZELASTINE HCL 205.5 UG/1
2 SPRAY NASAL
COMMUNITY
Start: 2021-03-05

## 2022-06-06 RX ORDER — ZALEPLON 10 MG/1
10 CAPSULE ORAL
COMMUNITY
Start: 2021-05-29

## 2022-06-06 RX ORDER — ZOLPIDEM TARTRATE 5 MG/1
TABLET ORAL
COMMUNITY
Start: 2022-05-26

## 2022-06-06 RX ORDER — MECLIZINE HYDROCHLORIDE 25 MG/1
25 TABLET ORAL
COMMUNITY
Start: 2022-05-10

## 2022-06-06 RX ORDER — LOPERAMIDE HCL 2 MG
CAPSULE ORAL
COMMUNITY
Start: 2021-06-29

## 2022-06-06 RX ORDER — CYCLOBENZAPRINE HCL 10 MG
10 TABLET ORAL
COMMUNITY
Start: 2021-10-14

## 2022-06-06 RX ORDER — CLINDAMYCIN HCL 300 MG
CAPSULE ORAL
COMMUNITY
Start: 2021-06-11

## 2022-06-06 RX ORDER — CHLORAL HYDRATE 500 MG
1 CAPSULE ORAL
COMMUNITY

## 2022-06-09 NOTE — PROGRESS NOTES
"[facility]  Surgery Consultation    PCP:  Giovanni Francis [phone]    HPI: Patient is a 77-year-old female known to me from prior laparoscopic appendectomy for acute appendicitis who presents as a self-referral for evaluation of cholelithiasis and symptomatic gallstones.  She has had recurring episodes primarily of epigastric abdominal pain.  She does not identify any specific triggers associated with this.  She has been evaluated for this and she has been found to have cholelithiasis with a 2.5 cm stone as well as a possible area of sludge versus polyp that measures 1.6 cm.  No ultrasound sequela of chronic cholecystitis.  No fevers or chills.  No tea colored urine or pale stools.    PMH:   has a past medical history of C. difficile diarrhea, Gastroesophageal reflux disease, Hypertension, and Hypokalemia.  PSH:    has a past surgical history that includes Laparoscopic appendectomy (N/A, 10/9/2020).  Social History:   reports that she has never smoked. She has never used smokeless tobacco. She reports that she does not drink alcohol and does not use drugs.  Family History:  family history is not on file.  Medications/Allergies: Home medications and allergies reviewed.    ROS:  The 10 point Review of Systems is negative other than noted in the HPI.    Physical Exam:  Ht 1.626 m (5' 4\")   Wt 55.3 kg (122 lb)   BMI 20.94 kg/m    GENERAL: Generally appears well.  Psych: Alert and Oriented.  Normal affect  Eyes: Sclera clear  Respiratory:  Lungs clear to ausculation bilaterally with good air excursion  Cardiovascular:  Regular Rate and Rhythm with no murmurs gallops or rubs, normal peripheral pulses  GI: Abdomen Non Distended Soft Non-Tender  No hernias palpated.  Lymphatic/Hematologic/Immune:  No femoral or cervical lymphadenopathy.  Integumentary:  No rashes  Neurological: grossly intact    Ultrasound reviewed and as described above in the HPI  All new lab and imaging data was reviewed. "     Impression and Plan:  Patient is a 77 year old female with symptomatic gallstones as well as possible gallbladder polyp.    PLAN:   I discussed the pathophysiology of gallbladder disease and complications of cholecystitis and choledocholithiasis with the patient.  I discussed with her at great length her management options.  Given her symptomatic gallstones as well as the size of the potential polyp she would be best served by cholecystectomy.  After thorough and lengthy discussion regarding this she seems somewhat reticent to proceed.  I encouraged her to give this careful consideration.  She inquired about follow-up ultrasound although I do not believe that is going to at any point change recommendations.  She was therefore encouraged to call back to schedule at her convenience.  I also discussed the risks associated with the procedure including, but not limited to infection, bleeding, conversion to open, bile leak, bile duct injury, retained gallstones, pneumonia, MI, and anesthesia complications with the patient.  I also discussed if a complication did occur it may require further surgical intervention during or after the procedure. The patient indicated understanding of the discussion, asked appropriate questions, and provided consent. I provided the patient an information pamphlet. I have recommended a low fat diet and instructed the patient to go to ER if they developed persistent pain, persistent nausea and vomiting, or yellowness of skin.      Thank you very much for this consult.    Jayden Garcia M.D.  [facility]  [phone]    Please route or send letter to:  Primary Care Provider (PCP) and Referring Provider

## 2022-06-27 ENCOUNTER — TELEPHONE (OUTPATIENT)
Dept: SURGERY | Facility: CLINIC | Age: 77
End: 2022-06-27

## 2022-06-27 NOTE — TELEPHONE ENCOUNTER
Patient dropped of a note with some past  imaging results about 2 weeks ago. She wanted to Dr. Garcia to review and her know what he thinks.    Patient would like a call back at: [phone]  OK to leave VM

## 2022-06-28 NOTE — TELEPHONE ENCOUNTER
Called patient and left message informing her that Dr. Garcia has not yet reviewed the information she dropped off in clinic.    He is currently out of the office and will be back at the end of the week next week    Will follow up with him at that time and call patient back once he has had a chance to review    Jolly Dang RN-BSN

## 2022-07-16 ENCOUNTER — APPOINTMENT (OUTPATIENT)
Dept: GENERAL RADIOLOGY | Facility: CLINIC | Age: 77
End: 2022-07-16
Attending: EMERGENCY MEDICINE
Payer: COMMERCIAL

## 2022-07-16 ENCOUNTER — HOSPITAL ENCOUNTER (EMERGENCY)
Facility: CLINIC | Age: 77
Discharge: HOME | End: 2022-07-16
Attending: EMERGENCY MEDICINE | Admitting: EMERGENCY MEDICINE
Payer: COMMERCIAL

## 2022-07-16 VITALS
RESPIRATION RATE: 16 BRPM | WEIGHT: 120 LBS | TEMPERATURE: 98.1 F | HEART RATE: 90 BPM | SYSTOLIC BLOOD PRESSURE: 192 MMHG | BODY MASS INDEX: 20.6 KG/M2 | DIASTOLIC BLOOD PRESSURE: 105 MMHG | OXYGEN SATURATION: 98 %

## 2022-07-16 DIAGNOSIS — S93.509A TOE SPRAIN, INITIAL ENCOUNTER: ICD-10-CM

## 2022-07-16 PROCEDURE — 29515 APPLICATION SHORT LEG SPLINT: CPT | Mod: LT

## 2022-07-16 PROCEDURE — 99284 EMERGENCY DEPT VISIT MOD MDM: CPT | Mod: 25

## 2022-07-16 PROCEDURE — 73630 X-RAY EXAM OF FOOT: CPT | Mod: LT

## 2022-07-16 ASSESSMENT — ENCOUNTER SYMPTOMS: JOINT SWELLING: 1

## 2022-07-16 NOTE — DISCHARGE INSTRUCTIONS
Wear Orthotic shoe for comfort for the next 1-3 weeks as needed until pain improves    Ice and elevate your toe as much as possible for the next 2-3 days.

## 2022-07-16 NOTE — ED NOTES
Discharge instructions gone over with pt, verbalized understanding. Discharged home with plan for follow up and no new prescriptions given. Denies questions at time of discharge.

## 2022-07-16 NOTE — ED PROVIDER NOTES
History   Chief Complaint:  Toe Injury     The history is provided by the patient.      Winston Mcleod is a 77 year old female with history of GERD and hyperlipidemia who presents with swelling to the left big toe.  She states she was going down the stairs when she skipped a step, causing her left big toe to bend under the foot.  No other injuries.  She has not been sick lately.  She denies fevers, chills, cough.  She denies head injury or neck pain.    Review of Systems   Musculoskeletal: Positive for joint swelling (left big toe).   All other systems reviewed and are negative.      Allergies:  No Known Allergies    Medications:  Alprazolam   Citalopram   Clindamycin   Cyclobenzaprine  Escitalopram  Gabapentin  Hydroxyzine   Loperamide   Meclizine   Omeprazole   Propranolol  Simvastatin  Valacyclovir  Zaleplon  Zolpidem    Past Medical History:     Pulmonary nodules  Cholelithiasis   Perforated appendicitis    Patellofemoral pain syndrome, bilateral  GERD  Hyperlipidemia   Breast cancer  Anxiety     Past Surgical History:    Appendectomy    Breast biopsy  Breast lumpectomy  Cataract extraction  Intraocular lens implant x2  Left Kelman phacoemulsification     Social History:  The patient presents in a private vehicle.  The patient presents with her .    Physical Exam     Patient Vitals for the past 24 hrs:   BP Temp Temp src Pulse Resp SpO2 Weight   07/16/22 0158 (!) 192/105 98.1  F (36.7  C) Temporal 90 16 98 % 54.4 kg (120 lb)       Physical Exam  Nursing note and vitals reviewed.  Constitutional:  Appears well-developed and well-nourished.   HENT:   Head:    Atraumatic.   Eyes:    Pupils are equal, round, and reactive to light.   Neck:    Normal range of motion. Neck supple.      No tracheal deviation present. No thyromegaly present.   Cardiovascular:  Normal rate, regular rhythm, no murmur   Pulmonary/Chest: Breath sounds are clear and equal without wheezes or crackles.  Abdominal:   Soft. Bowel sounds  are normal. Exhibits no distension and      no mass. There is no tenderness.      There is no rebound and no guarding.   Musculoskeletal:  Left foot exam: bruising with slight swelling and tenderness to the great toe, primarily in the region of the proximal phalanx and IP joint. No deformity. The remainder of the foot is nontender. ankle nontender. No wounds. Sensation intact distally. Good capillary refill.  Neurological:   Alert and oriented to person, place, and time.   Skin:    Skin is warm and dry. No rash noted. No pallor.       Emergency Department Course     Imaging:  Foot XR, G/E 3 views, left   Final Result   IMPRESSION: No acute fracture or dislocation. 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        Report per radiology    Emergency Department Course:    Reviewed:  I reviewed nursing notes, vitals, past medical history and Care Everywhere    Assessments:  0258 I obtained history and examined the patient as noted above.    Disposition:  The patient was discharged to home.     Impression & Plan     Medical Decision Making:  I found the patient to have a toe injury consistent with a toe sprain without any sign of fracture or dislocation on x-rays.  There is also no sign of cellulitis.  She was placed in an orthotic postop shoe and given a cane.  I discussed with her that she should ice and elevate the toe is much as possible over the next 2 to 3 days.  She was told to follow-up with orthopedic surgery if the pain is not improved in 1 to 2 weeks.  I felt she be safely discharged home.    Diagnosis:    ICD-10-CM    1. Toe sprain, initial encounter  S93.509A        Discharge Medications:  Discharge Medication List as of 7/16/2022  3:25 AM          Scribe Disclosure:  Leslie PALACIO, am serving as a scribe at 2:58 AM on 7/16/2022 to document services personally performed by Savanna Salinas MD based on my observations and the provider's statements to me.          Savanan Salinas MD  07/16/22 0036

## 2022-07-16 NOTE — ED TRIAGE NOTES
Patient mis stepped on the stairs and have a swollen left big toe.     Triage Assessment     Row Name 07/16/22 0204       Triage Assessment (Adult)    Airway WDL WDL       Respiratory WDL    Respiratory WDL WDL       Skin Circulation/Temperature WDL    Skin Circulation/Temperature WDL WDL       Cardiac WDL    Cardiac WDL WDL       Peripheral/Neurovascular WDL    Peripheral Neurovascular WDL WDL       Cognitive/Neuro/Behavioral WDL    Cognitive/Neuro/Behavioral WDL WDL

## 2022-10-22 ENCOUNTER — HEALTH MAINTENANCE LETTER (OUTPATIENT)
Age: 77
End: 2022-10-22

## 2022-11-07 ENCOUNTER — HOSPITAL ENCOUNTER (EMERGENCY)
Facility: CLINIC | Age: 77
Discharge: HOME | End: 2022-11-07
Attending: EMERGENCY MEDICINE | Admitting: EMERGENCY MEDICINE
Payer: COMMERCIAL

## 2022-11-07 VITALS
HEART RATE: 66 BPM | OXYGEN SATURATION: 99 % | TEMPERATURE: 97.5 F | HEIGHT: 64 IN | WEIGHT: 126 LBS | BODY MASS INDEX: 21.51 KG/M2 | RESPIRATION RATE: 18 BRPM | DIASTOLIC BLOOD PRESSURE: 101 MMHG | SYSTOLIC BLOOD PRESSURE: 164 MMHG

## 2022-11-07 DIAGNOSIS — S76.911A MUSCLE STRAIN OF THIGH, RIGHT, INITIAL ENCOUNTER: ICD-10-CM

## 2022-11-07 DIAGNOSIS — R03.0 ELEVATED BLOOD PRESSURE READING WITHOUT DIAGNOSIS OF HYPERTENSION: ICD-10-CM

## 2022-11-07 PROBLEM — J30.9 ALLERGIC RHINITIS: Status: ACTIVE | Noted: 2022-11-07

## 2022-11-07 PROBLEM — A04.72 C. DIFFICILE COLITIS: Status: ACTIVE | Noted: 2021-09-15

## 2022-11-07 PROBLEM — S32.020A COMPRESSION FRACTURE OF L2 LUMBAR VERTEBRA (H): Status: ACTIVE | Noted: 2022-11-07

## 2022-11-07 PROCEDURE — 99284 EMERGENCY DEPT VISIT MOD MDM: CPT | Mod: 25

## 2022-11-07 PROCEDURE — 29505 APPLICATION LONG LEG SPLINT: CPT | Mod: LT

## 2022-11-07 ASSESSMENT — ENCOUNTER SYMPTOMS
NUMBNESS: 0
SHORTNESS OF BREATH: 0
NECK PAIN: 0
BACK PAIN: 0
WEAKNESS: 0

## 2022-11-07 ASSESSMENT — ACTIVITIES OF DAILY LIVING (ADL): ADLS_ACUITY_SCORE: 35

## 2022-11-08 NOTE — ED TRIAGE NOTES
Triage Assessment     Row Name 11/07/22 2024       Triage Assessment (Adult)    Airway WDL WDL       Respiratory WDL    Respiratory WDL WDL       Skin Circulation/Temperature WDL    Skin Circulation/Temperature WDL WDL       Cardiac WDL    Cardiac WDL WDL       Peripheral/Neurovascular WDL    Peripheral Neurovascular WDL WDL       Cognitive/Neuro/Behavioral WDL    Cognitive/Neuro/Behavioral WDL WDL            Pt. Lost balance 2 weeks ago and injured right thigh. No relief with PT and triage nurse advised to her to go to ER. Denies CP or SOB. Pain is worse with ROM.

## 2022-11-08 NOTE — ED PROVIDER NOTES
History     Chief Complaint:  Leg Pain (Pt. Lost balance 2 weeks ago and injured right thigh. No relief with PT and triage nurse advised to her to go to ER. Denies CP or SOB. Pain is worse with ROM. )       MATTHIEU Mcleod is a 77 year old female who presents with right thigh pain that she has had since slipping at [email or web address] on October 20.  She was in [place] and was actually leaning down on the floor and just tipped over and she bruised her left hand and her left knee but did not have much pain in the right thigh at that time.  Since then the right thigh has become more tender and the other parts have healed.  She is going to physical therapy and because the pain is gotten worse with just in the quadricep muscle per physical therapist that she should be seen in the ER.  She called the nurse line and they told her to come to the ER as well.  She does not have any calf tenderness or leg edema.  She has been using Advil 600 which seemed to help for a while.  No chest pain or shortness of breath.  No numbness in the leg.  No other injuries.    ROS:  Review of Systems   Respiratory: Negative for shortness of breath.    Musculoskeletal: Negative for back pain and neck pain.        Right thigh pain   Neurological: Negative for weakness and numbness.   All other systems reviewed and are negative.         Allergies:  No Known Allergies     Medications:    acyclovir (ZOVIRAX) 400 MG tablet  ALPRAZolam (XANAX) 0.5 MG tablet  ammonium lactate (AMLACTIN) 12 % external cream  azelastine (ASTEPRO) 0.15 % nasal spray  calcium carbonate 500 mg, elemental, (OSCAL 500) 1250 (500 Ca) MG TABS tablet  CEQUA 0.09 % SOLN  cholecalciferol 25 MCG (1000 UT) TABS  citalopram (CELEXA) 20 MG tablet  clindamycin (CLEOCIN) 300 MG capsule  cyclobenzaprine (FLEXERIL) 10 MG tablet  escitalopram (LEXAPRO) 5 MG tablet  fish oil-omega-3 fatty acids 1000 MG capsule  gabapentin (NEURONTIN) 100 MG capsule  hydrOXYzine (ATARAX) 25 MG tablet  ibuprofen  "(ADVIL/MOTRIN) 200 MG tablet  loperamide (IMODIUM) 2 MG capsule  MAGNESIUM PO  meclizine (ANTIVERT) 25 MG tablet  multivitamin, therapeutic (THERA-VIT) TABS tablet  Omega-3 Fatty Acids (FISH OIL PO)  omeprazole (PRILOSEC) 20 MG DR capsule  propranolol (INDERAL) 40 MG tablet  RESTASIS 0.05 % ophthalmic emulsion  simvastatin (ZOCOR) 10 MG tablet  triamcinolone (NASACORT) 55 MCG/ACT nasal aerosol  valACYclovir (VALTREX) 1000 mg tablet  zaleplon (SONATA) 10 MG capsule  zolpidem (AMBIEN) 5 MG tablet        Past Medical History:    Past Medical History:   Diagnosis Date     C. difficile diarrhea      Gastroesophageal reflux disease      Hypertension      Hypokalemia        Past Surgical History:    Past Surgical History:   Procedure Laterality Date     LAPAROSCOPIC APPENDECTOMY N/A 10/9/2020    Procedure: LAPAROSCOPIC APPENDECTOMY;  Surgeon: Jayden Garcia MD;  Location:  OR        Family History:    family history is not on file.    Social History:   reports that she has never smoked. She has never used smokeless tobacco. She reports that she does not drink alcohol and does not use drugs.  PCP: Giovanni Francis     Physical Exam     Patient Vitals for the past 24 hrs:   BP Temp Pulse Resp SpO2 Height Weight   11/07/22 2059 (!) 164/101 -- -- -- -- -- --   11/07/22 2022 (!) 206/102 97.5  F (36.4  C) 66 18 99 % 1.626 m (5' 4\") 57.2 kg (126 lb)        Physical Exam  Nursing note and vitals reviewed.    Constitutional:  Appears comfortable.    Musculoskeletal:  Range of motion of the right leg is normal but has pain in the quadricep muscle on the right.  No Edema in the leg.  Neurological:   Alert and oriented. Exhibits good muscle tone.      Sensation in the leg is grossly normal.  Psychiatric:   Behavior is normal. Appropriate mood and affect.     Judgment and thought content normal.       Emergency Department Course       Emergency Department Course:      Reviewed:  I reviewed nursing notes, vitals and past " medical history    Assessments:  2050 I obtained history and examined the patient as noted above.     Interventions:  Medications - No data to display     Disposition:  The patient was discharged to home.     Impression & Plan        Medical Decision Making:  Patient comes in with right thigh pain that she has had since 20 October when she slid on the floor and injured her self.  She did not fall.  She has been using heat and taking ibuprofen but it seems to be getting worse.  Her exam is normal other than tenderness right quadricep muscle no edema in the leg.  I believe that she strained her quad muscle and it is just becoming more of a chronic problem now and a knee immobilizer I think will allow the muscle to relax and help her to start healing.  I did have her set up an appointment at [facility] orthopedics.  She will bump her ibuprofen up and continue to ice it.  She did have an elevated blood pressure reading here and she is got a diagnosis of this without a diagnosis of hypertension.  She will get this rechecked in the clinic.  Her repeat was much better.    Ice, wear the knee immobilizer at all times except for when you take a shower and drive.  You can take it off to sleep if it bothers you.  Schedule an appointment at [facility] orthopedics to see a doctor there for further evaluation and treatment.  Take ibuprofen 800 mg 3 times a day with food.  Set up an appointment with your doctor in the clinic to have your blood pressure rechecked as well.    Diagnosis:    ICD-10-CM    1. Muscle strain of thigh, right, initial encounter  S76.911A       2. Elevated blood pressure reading without diagnosis of hypertension  R03.0            Discharge Medications:  New Prescriptions    No medications on file        11/7/2022   Patrica Green MD Powell, Tracy Alan, MD  11/07/22 2100

## 2022-11-08 NOTE — DISCHARGE INSTRUCTIONS
Ice, wear the knee immobilizer at all times except for when you take a shower and drive.  You can take it off to sleep if it bothers you.  Schedule an appointment at [facility] orthopedics to see a doctor there for further evaluation and treatment.  Take ibuprofen 800 mg 3 times a day with food.  Set up an appointment with your doctor in the clinic to have your blood pressure rechecked as well.

## 2024-05-28 ENCOUNTER — NURSE TRIAGE (OUTPATIENT)
Dept: NURSING | Facility: CLINIC | Age: 79
End: 2024-05-28
Payer: COMMERCIAL

## 2024-05-29 NOTE — TELEPHONE ENCOUNTER
Patient calling. She had a mammogram today and has been told that it needs to be redone. She is wondering if it's due to the lotion she had on her arm.     No triage.     Macarena Everett RN  [facility]  May 28, 2024, 11:57 PM    Reason for Disposition   Health Information question, no triage required and triager able to answer question    Additional Information   Negative: [1] Caller is not with the adult (patient) AND [2] reporting urgent symptoms   Negative: Lab result questions   Negative: Medication questions   Negative: Caller can't be reached by phone   Negative: Caller has already spoken to PCP or another triager   Negative: RN needs further essential information from caller in order to complete triage   Negative: Requesting regular office appointment   Negative: [1] Caller requesting NON-URGENT health information AND [2] PCP's office is the best resource    Protocols used: Information Only Call - [facility]

## 2024-07-23 ENCOUNTER — HOSPITAL ENCOUNTER (EMERGENCY)
Facility: CLINIC | Age: 79
Discharge: HOME | End: 2024-07-23
Admitting: STUDENT IN AN ORGANIZED HEALTH CARE EDUCATION/TRAINING PROGRAM
Payer: COMMERCIAL

## 2024-07-23 VITALS — RESPIRATION RATE: 18 BRPM | HEART RATE: 77 BPM | OXYGEN SATURATION: 98 %

## 2024-07-23 PROCEDURE — 99281 EMR DPT VST MAYX REQ PHY/QHP: CPT

## 2024-07-23 ASSESSMENT — COLUMBIA-SUICIDE SEVERITY RATING SCALE - C-SSRS
1. IN THE PAST MONTH, HAVE YOU WISHED YOU WERE DEAD OR WISHED YOU COULD GO TO SLEEP AND NOT WAKE UP?: NO
6. HAVE YOU EVER DONE ANYTHING, STARTED TO DO ANYTHING, OR PREPARED TO DO ANYTHING TO END YOUR LIFE?: NO
2. HAVE YOU ACTUALLY HAD ANY THOUGHTS OF KILLING YOURSELF IN THE PAST MONTH?: NO

## 2024-07-23 NOTE — ED TRIAGE NOTES
Pt having R ear pain for 3 weeks and had an appt today but could not be seen. Pt very nervous and wont allow RN to get full set of vitals. Says she may leave and come back

## 2024-09-13 ENCOUNTER — HOSPITAL ENCOUNTER (EMERGENCY)
Facility: CLINIC | Age: 79
Discharge: HOME | End: 2024-09-13
Attending: EMERGENCY MEDICINE | Admitting: EMERGENCY MEDICINE
Payer: COMMERCIAL

## 2024-09-13 ENCOUNTER — APPOINTMENT (OUTPATIENT)
Dept: CT IMAGING | Facility: CLINIC | Age: 79
End: 2024-09-13
Attending: EMERGENCY MEDICINE
Payer: COMMERCIAL

## 2024-09-13 VITALS
TEMPERATURE: 97.8 F | DIASTOLIC BLOOD PRESSURE: 76 MMHG | HEART RATE: 69 BPM | BODY MASS INDEX: 23.17 KG/M2 | SYSTOLIC BLOOD PRESSURE: 145 MMHG | OXYGEN SATURATION: 98 % | WEIGHT: 135 LBS | RESPIRATION RATE: 16 BRPM

## 2024-09-13 DIAGNOSIS — R51.9 NONINTRACTABLE HEADACHE, UNSPECIFIED CHRONICITY PATTERN, UNSPECIFIED HEADACHE TYPE: ICD-10-CM

## 2024-09-13 DIAGNOSIS — Z48.89 ENCOUNTER FOR POST SURGICAL WOUND CHECK: ICD-10-CM

## 2024-09-13 DIAGNOSIS — L03.90 CELLULITIS, UNSPECIFIED CELLULITIS SITE: ICD-10-CM

## 2024-09-13 LAB
ALBUMIN UR-MCNC: NEGATIVE MG/DL
ANION GAP SERPL CALCULATED.3IONS-SCNC: 11 MMOL/L (ref 7–15)
APPEARANCE UR: CLEAR
BASOPHILS # BLD AUTO: 0 10E3/UL (ref 0–0.2)
BASOPHILS NFR BLD AUTO: 1 %
BILIRUB UR QL STRIP: NEGATIVE
BUN SERPL-MCNC: 21.6 MG/DL (ref 8–23)
CALCIUM SERPL-MCNC: 10.4 MG/DL (ref 8.8–10.4)
CHLORIDE SERPL-SCNC: 104 MMOL/L (ref 98–107)
COLOR UR AUTO: YELLOW
CREAT SERPL-MCNC: 0.83 MG/DL (ref 0.51–0.95)
EGFRCR SERPLBLD CKD-EPI 2021: 72 ML/MIN/1.73M2
EOSINOPHIL # BLD AUTO: 0.1 10E3/UL (ref 0–0.7)
EOSINOPHIL NFR BLD AUTO: 3 %
ERYTHROCYTE [DISTWIDTH] IN BLOOD BY AUTOMATED COUNT: 12.3 % (ref 10–15)
FLUAV RNA SPEC QL NAA+PROBE: NEGATIVE
FLUBV RNA RESP QL NAA+PROBE: NEGATIVE
GLUCOSE SERPL-MCNC: 90 MG/DL (ref 70–99)
GLUCOSE UR STRIP-MCNC: NEGATIVE MG/DL
HCO3 SERPL-SCNC: 27 MMOL/L (ref 22–29)
HCT VFR BLD AUTO: 45.5 % (ref 35–47)
HGB BLD-MCNC: 15.4 G/DL (ref 11.7–15.7)
HGB UR QL STRIP: NEGATIVE
IMM GRANULOCYTES # BLD: 0 10E3/UL
IMM GRANULOCYTES NFR BLD: 0 %
KETONES UR STRIP-MCNC: NEGATIVE MG/DL
LEUKOCYTE ESTERASE UR QL STRIP: NEGATIVE
LYMPHOCYTES # BLD AUTO: 1.8 10E3/UL (ref 0.8–5.3)
LYMPHOCYTES NFR BLD AUTO: 37 %
MCH RBC QN AUTO: 30.6 PG (ref 26.5–33)
MCHC RBC AUTO-ENTMCNC: 33.8 G/DL (ref 31.5–36.5)
MCV RBC AUTO: 90 FL (ref 78–100)
MONOCYTES # BLD AUTO: 0.3 10E3/UL (ref 0–1.3)
MONOCYTES NFR BLD AUTO: 7 %
NEUTROPHILS # BLD AUTO: 2.4 10E3/UL (ref 1.6–8.3)
NEUTROPHILS NFR BLD AUTO: 52 %
NITRATE UR QL: NEGATIVE
NRBC # BLD AUTO: 0 10E3/UL
NRBC BLD AUTO-RTO: 0 /100
PH UR STRIP: 7 [PH] (ref 5–7)
PLATELET # BLD AUTO: 195 10E3/UL (ref 150–450)
POTASSIUM SERPL-SCNC: 4.7 MMOL/L (ref 3.4–5.3)
RBC # BLD AUTO: 5.04 10E6/UL (ref 3.8–5.2)
RBC URINE: 1 /HPF
RSV RNA SPEC NAA+PROBE: NEGATIVE
SARS-COV-2 RNA RESP QL NAA+PROBE: NEGATIVE
SODIUM SERPL-SCNC: 142 MMOL/L (ref 135–145)
SP GR UR STRIP: 1.02 (ref 1–1.03)
SQUAMOUS EPITHELIAL: <1 /HPF
UROBILINOGEN UR STRIP-MCNC: NORMAL MG/DL
WBC # BLD AUTO: 4.7 10E3/UL (ref 4–11)
WBC URINE: 1 /HPF

## 2024-09-13 PROCEDURE — 80048 BASIC METABOLIC PNL TOTAL CA: CPT | Performed by: EMERGENCY MEDICINE

## 2024-09-13 PROCEDURE — 87637 SARSCOV2&INF A&B&RSV AMP PRB: CPT | Performed by: EMERGENCY MEDICINE

## 2024-09-13 PROCEDURE — 81001 URINALYSIS AUTO W/SCOPE: CPT | Performed by: EMERGENCY MEDICINE

## 2024-09-13 PROCEDURE — 36415 COLL VENOUS BLD VENIPUNCTURE: CPT | Performed by: EMERGENCY MEDICINE

## 2024-09-13 PROCEDURE — 99284 EMERGENCY DEPT VISIT MOD MDM: CPT | Mod: 25

## 2024-09-13 PROCEDURE — 250N000013 HC RX MED GY IP 250 OP 250 PS 637: Performed by: EMERGENCY MEDICINE

## 2024-09-13 PROCEDURE — 85049 AUTOMATED PLATELET COUNT: CPT | Performed by: EMERGENCY MEDICINE

## 2024-09-13 PROCEDURE — 70450 CT HEAD/BRAIN W/O DYE: CPT

## 2024-09-13 RX ORDER — CEPHALEXIN 500 MG/1
500 CAPSULE ORAL 4 TIMES DAILY
Qty: 28 CAPSULE | Refills: 0 | Status: SHIPPED | OUTPATIENT
Start: 2024-09-13 | End: 2024-09-20

## 2024-09-13 RX ORDER — IBUPROFEN 600 MG/1
600 TABLET, FILM COATED ORAL ONCE
Status: COMPLETED | OUTPATIENT
Start: 2024-09-13 | End: 2024-09-13

## 2024-09-13 RX ORDER — ACETAMINOPHEN 500 MG
1000 TABLET ORAL ONCE
Status: COMPLETED | OUTPATIENT
Start: 2024-09-13 | End: 2024-09-13

## 2024-09-13 RX ADMIN — IBUPROFEN 600 MG: 600 TABLET ORAL at 15:00

## 2024-09-13 RX ADMIN — ACETAMINOPHEN 1000 MG: 500 TABLET ORAL at 14:59

## 2024-09-13 ASSESSMENT — ACTIVITIES OF DAILY LIVING (ADL)
ADLS_ACUITY_SCORE: 38

## 2024-09-13 NOTE — ED PROVIDER NOTES
Emergency Department Note      History of Present Illness     Chief Complaint   Wound Check and Headache      HPI   Winston Mcleod is a 79 year old female with hx of recent hardware removal with Dr. Denise 3 weeks ago, presents with wound check and HA.  Patient had recent hardware removal to the left forearm.  She states that over the last week she has noted increased color change surrounding the surgical incision and pain.  She states that she was seen at [facility] twice within the last week.  Most recently seen at [facility] urgent care.  She has history of C. difficile previously and they were hesitant to prescribe any antibiotics.  She states that yesterday she developed generalized headache, subjective fevers.  States that she has chronic congestion that is unchanged.  No recent cough.  No known sick contacts.  She states that she was seen by PCP prior to arrival today sent to the ER for further evaluation.  She states the headache is an 8 out of 10 and generalized.  Denies any neck pain or stiffness.    Independent Historian   None    Review of External Notes   Reviewed office visit from today with PCP.     Past Medical History     Medical History and Problem List   Past Medical History:   Diagnosis Date    C. difficile diarrhea     Gastroesophageal reflux disease     Hypertension     Hypokalemia        Medications   cephALEXin (KEFLEX) 500 MG capsule  acyclovir (ZOVIRAX) 400 MG tablet  ALPRAZolam (XANAX) 0.5 MG tablet  ammonium lactate (AMLACTIN) 12 % external cream  azelastine (ASTEPRO) 0.15 % nasal spray  calcium carbonate 500 mg, elemental, (OSCAL 500) 1250 (500 Ca) MG TABS tablet  CEQUA 0.09 % SOLN  cholecalciferol 25 MCG (1000 UT) TABS  citalopram (CELEXA) 20 MG tablet  clindamycin (CLEOCIN) 300 MG capsule  cyclobenzaprine (FLEXERIL) 10 MG tablet  escitalopram (LEXAPRO) 5 MG tablet  fish oil-omega-3 fatty acids 1000 MG capsule  gabapentin (NEURONTIN) 100 MG capsule  hydrOXYzine (ATARAX) 25 MG tablet  ibuprofen  (ADVIL/MOTRIN) 200 MG tablet  loperamide (IMODIUM) 2 MG capsule  MAGNESIUM PO  meclizine (ANTIVERT) 25 MG tablet  multivitamin, therapeutic (THERA-VIT) TABS tablet  Omega-3 Fatty Acids (FISH OIL PO)  omeprazole (PRILOSEC) 20 MG DR capsule  propranolol (INDERAL) 40 MG tablet  RESTASIS 0.05 % ophthalmic emulsion  simvastatin (ZOCOR) 10 MG tablet  triamcinolone (NASACORT) 55 MCG/ACT nasal aerosol  valACYclovir (VALTREX) 1000 mg tablet  zaleplon (SONATA) 10 MG capsule  zolpidem (AMBIEN) 5 MG tablet        Surgical History   Past Surgical History:   Procedure Laterality Date    LAPAROSCOPIC APPENDECTOMY N/A 10/9/2020    Procedure: LAPAROSCOPIC APPENDECTOMY;  Surgeon: Jayden Garcia MD;  Location:  OR       Physical Exam     Patient Vitals for the past 24 hrs:   BP Temp Temp src Pulse Resp SpO2 Weight   09/13/24 1737 (!) 145/76 -- -- 69 16 -- --   09/13/24 1228 (!) 159/81 97.8  F (36.6  C) Temporal 79 16 98 % 61.2 kg (135 lb)     Physical Exam  Vitals reviewed.   Constitutional:       General: She is not in acute distress.     Appearance: She is not ill-appearing.   HENT:      Head: Normocephalic and atraumatic.   Eyes:      Extraocular Movements: Extraocular movements intact.   Cardiovascular:      Rate and Rhythm: Normal rate and regular rhythm.   Pulmonary:      Effort: Pulmonary effort is normal. No respiratory distress.      Breath sounds: Normal breath sounds. No wheezing.   Abdominal:      Palpations: Abdomen is soft.      Tenderness: There is no abdominal tenderness. There is no guarding.   Musculoskeletal:      Cervical back: Normal range of motion.      Comments: Left forearm there is a surgical incision appears clean dry intact.  There are surrounding purpleish discoloration.  No palpable induration or fluctuance.   Skin:     General: Skin is warm and dry.   Neurological:      Mental Status: She is alert and oriented to person, place, and time.      GCS: GCS eye subscore is 4. GCS verbal subscore is  5. GCS motor subscore is 6.   Psychiatric:         Behavior: Behavior normal.           Diagnostics     Lab Results   Labs Ordered and Resulted from Time of ED Arrival to Time of ED Departure   BASIC METABOLIC PANEL - Normal       Result Value    Sodium 142      Potassium 4.7      Chloride 104      Carbon Dioxide (CO2) 27      Anion Gap 11      Urea Nitrogen 21.6      Creatinine 0.83      GFR Estimate 72      Calcium 10.4      Glucose 90     ROUTINE UA WITH MICROSCOPIC REFLEX TO CULTURE - Normal    Color Urine Yellow      Appearance Urine Clear      Glucose Urine Negative      Bilirubin Urine Negative      Ketones Urine Negative      Specific Gravity Urine 1.017      Blood Urine Negative      pH Urine 7.0      Protein Albumin Urine Negative      Urobilinogen Urine Normal      Nitrite Urine Negative      Leukocyte Esterase Urine Negative      RBC Urine 1      WBC Urine 1      Squamous Epithelials Urine <1     INFLUENZA A/B, RSV, & SARS-COV2 PCR - Normal    Influenza A PCR Negative      Influenza B PCR Negative      RSV PCR Negative      SARS CoV2 PCR Negative     CBC WITH PLATELETS AND DIFFERENTIAL    WBC Count 4.7      RBC Count 5.04      Hemoglobin 15.4      Hematocrit 45.5      MCV 90      MCH 30.6      MCHC 33.8      RDW 12.3      Platelet Count 195      % Neutrophils 52      % Lymphocytes 37      % Monocytes 7      % Eosinophils 3      % Basophils 1      % Immature Granulocytes 0      NRBCs per 100 WBC 0      Absolute Neutrophils 2.4      Absolute Lymphocytes 1.8      Absolute Monocytes 0.3      Absolute Eosinophils 0.1      Absolute Basophils 0.0      Absolute Immature Granulocytes 0.0      Absolute NRBCs 0.0         Imaging   CT Head w/o Contrast   Final Result   IMPRESSION:      1. No acute intracranial abnormality.   2. Partially calcified extra-axial mass along the anterior falx likely   reflects a meningioma. Contrast-enhanced MRI can be performed for   further evaluation.         CELESTINO PHILLIPS MD             SYSTEM ID:  [id number]            Independent Interpretation   CT Head: No intracranial hemorrhage.    ED Course      Medications Administered   Medications   acetaminophen (TYLENOL) tablet 1,000 mg (1,000 mg Oral $Given 9/13/24 8443)   ibuprofen (ADVIL/MOTRIN) tablet 600 mg (600 mg Oral $Given 9/13/24 1500)       Procedures   Procedures     Discussion of Management   None    ED Course        Additional Documentation  None    Medical Decision Making / Diagnosis     CMS Diagnoses: None    MIPS       None    [facility]   Winston Mcleod is a 79 year old female who presents today with wound check, headache.  She states that she had recent hardware removal to the left forearm.  States that over the last couple days she has developed color change to the skin, states it appears more swollen.  She states that she does not wear the compressive sleeve as instructed because it is uncomfortable.  She is been seen by [facility] twice this week.  She has prior history of C. difficile and they did not want to prescribe any antibiotics.  She was seen by PCP today who prescribed her vancomycin that she can take with antibiotic.  They recommended she come here for further evaluation.  Her white count was normal.  CT of the head was ordered that was normal.  She was given Tylenol ibuprofen and headache resolved.  I discussed the plan for Keflex for cellulitis.  Discussed with them care instructions and return precautions.  Instructed them to follow-up with orthopedic surgery.  Recommended she wear the compressive dressing to help with the swelling.  She agrees with plan of care.    Disposition   The patient was discharged.     Diagnosis     ICD-10-CM    1. Cellulitis, unspecified cellulitis site  L03.90       2. Nonintractable headache, unspecified chronicity pattern, unspecified headache type  R51.9       3. Encounter for post surgical wound check  Z48.89            Discharge Medications   Discharge Medication List as of 9/13/2024  5:27 PM         START taking these medications    Details   cephALEXin (KEFLEX) 500 MG capsule Take 1 capsule (500 mg) by mouth 4 times daily for 7 days., Disp-28 capsule, R-0, E-Prescribe               DO Bang Pablo Tiffani, DO  09/13/24 1166

## 2024-09-13 NOTE — DISCHARGE INSTRUCTIONS
You can take Tylenol 1000 mg every 6 hours  With a max dose of Tylenol in a 24-hour period is 4000 mg    You can take ibuprofen 600 mg every 8 hours.  Do not take it on empty stomach.    Take the antibiotics as directed.    If your urine shows infection I will call you if it is normal you will not receive a call.    Follow-up with your primary care doctor in 1 to 2 days    Follow-up with your orthopedist    Return to the ER for any worsening or concerning symptoms

## 2024-09-13 NOTE — ED TRIAGE NOTES
Pt complains of headache and left elbow pain where she had surgery on the 20th of August. Pt went to PCP today and was sent here for further eval.     Triage Assessment (Adult)       Row Name 09/13/24 1229          Triage Assessment    Airway WDL WDL        Respiratory WDL    Respiratory WDL WDL        Skin Circulation/Temperature WDL    Skin Circulation/Temperature WDL X  left elbow post op eccymosis, pain 6/10        Cardiac WDL    Cardiac WDL X  HTN        Peripheral/Neurovascular WDL    Peripheral Neurovascular WDL WDL        Cognitive/Neuro/Behavioral WDL    Cognitive/Neuro/Behavioral WDL WDL

## 2024-10-15 NOTE — PLAN OF CARE
A&O x4, appears anxious/nervous with repeated calls for the same things such as being concerned about loose stools. MD aware, maroon/brown loose stools, hgb check ordered this morning came back WDL at 14.4.   Reassurance provided.    VSS on RA EX HTN, provider notified of elevated Bp's, CTM. Up SBA, ambulation encouraged. LS clear. BS+. Voiding.  Incisions intact with steri strips.  Pt showered today.  BERYL drain removed- pt did not tolerate well. Felt high pain at removal of suture and first attempt to remove BERYL. Stopped and gave oxycodone for pain. At 2nd attempt of removal of BERYL pt was still very nervous and experienced high pain, asking for morphine. Slow deep breathing encouraged and BERYL was removed without complication. Pain controlled afterwards.    
A&O x4. VSS on RA. Pain controlled by oxycodone. CMS WDL. LS clear. BS+, BM+, flatus+. Abdominal sites with some dried blood, previous BERYL site dressing CDI. Tolerating regular diet. Denies N/V. Voiding adequately. Up with SBA.     
A&O, able to communicate needs. VSS ex BP slightly elevated as it has been.    Showered.  Incisions intact.   Band aid replaced over old BERYL site for comfort to prevent rubbing on clothes, no drainage.    AVS reviewed with pt and her , all questions answered.  Discharge meds and belongings with pt who was escorted via w/c to door two with transportation home by her .    
A&Ox 4  VSS on RA  Pain managed by Scheduled tylenol and PRN Oxycodone  Abnormal Labs: Toan 8.3  Lungs: Clear/dim  ABD/BS:  Hypoactive, -flatus  Bowel and Bladder:  Continent, adequate UOP  Pulses/CMS:  +2, intact  Skin:  3 Lap sites ( 1 underneath dressing)   Diet: NPO ex ice chips and sips of water  Mobility: SBA  Drains/Devices:  BERYL to lower abd (strip q8hrs)  Procedures/Tests: None  Discharge Plan: Pending  Other:  Pt refused dulcolax suppository, Pt up walking in room throughout the day.             
A+Ox4 VSS on room air. LS clear. Lap sites w/ steri strips CDI. Abdomen distended but soft. Bowels hypo, flatus+. Voiding adequately. BERYL to bulb suction, stripped per order. Oxy and tylenol for pain. Up SBA. Tolerating clears.  
POD #3 Lap appy. VSS on RA. A&Ox4. Pain managed w/ scheduled tylenol. Up SBA. LS clear. Abdomen soft, but distended. Lap sites WNL, BERYL to bulb suction. BS hypo, +gas. Suppository given w/o results. Tolerating clear liquid diet, Full liquid diet for dinner tonight. Voiding adequately in BR. Discharge pending progress. Continue to monitor.   
POD 2. A&Ox4. VSS on RA. Lap sites x3, 2 covered with band-aids, CDI, One under BERYL dsg. BERYL w/ serosanguinous output, dsg CDI, with small amount of dried drainage. ABD binder in place. Sched tylenol and ice packs for incisional pain and headaches. LS clear, BS+ hypoactive, flatus-, voiding adequately. SBA. NPO ex meds/ice/sips of water. Denies nausea. Senna given in AM, suppository given in afternoon. Pt  frustrated w/ lack of progress. Plan to continue bowel rest and reassess tomorrow.     
Patient arrived from PACU around 1815. Vital signs stable on 2L O2. 3 lap sites, covered with band aids. 1 BERYL Lower middle abdomen. NPO. Dilaudid makes patient very sleepy.   
Pt is A&Ox4, VSS, on RA, scheduled tylenol for pain management-declined oxycodone when offered. Lap sites x3, 2 covered with band-aids are CDI. One lap site under BERYL dsg. BERYL x1 to lower abdomen, serosanguinous output, dsg is CDI. ABD in place, ABD rounded. LS clear, BS+ hypoactive, flatus-, voiding adequately. Pt is up SBA, is NPO ex meds/ice/sips of water-denies N/V.     
Pt is A&Ox4, VSS, weaned to RA. PRN oxycodone for pain management. Lap sites x3 covered with band-aids, are CDI. BERYL x1 to lower abdomen, dsg is CDI, serosanguinous/purulent output. ABD binder in place. LS dim, BS+ hypoactive, flatus-, adequate UOP. Pt is NPO ex meds/ice/sips of water-tolerates well, denies N/V. Pt dangled last night, is up SBA.   
VSS on RA. A/Ox 4. Incisions CDI, BERYL drain in place, stripped x1. CMS intact. Up with SBA. Tolerating clear liquid diet. Denied N&V. +gas, pt had small bm on shift. Voiding adequate. LS clear. Will continue to monitor.   
VSS on RA. A/Ox 4. Incisions CDI. CMS intact. Pain controlled with scheduled Tylenol. Up with SBA. Tolerating full liquid diet. Denied N&V. +gas, +bm. Voiding adequate. LS clear. BERYL drain to bulb suction patent. Will continue to monitor.   
VSS on RA. A/Ox 4. Incisions CDI. CMS intact. Pain controlled with scheduled Tylenol. Up with SBA. Tolerating regular. Denied N&V. +gas, +bm. Voiding adequate. LS clear. Will continue to monitor.   
Private car

## 2024-10-20 ENCOUNTER — HEALTH MAINTENANCE LETTER (OUTPATIENT)
Age: 79
End: 2024-10-20

## 2024-11-08 ENCOUNTER — TRANSFERRED RECORDS (OUTPATIENT)
Dept: HEALTH INFORMATION MANAGEMENT | Facility: CLINIC | Age: 79
End: 2024-11-08
Payer: COMMERCIAL

## 2024-11-11 ENCOUNTER — TRANSFERRED RECORDS (OUTPATIENT)
Dept: HEALTH INFORMATION MANAGEMENT | Facility: CLINIC | Age: 79
End: 2024-11-11
Payer: COMMERCIAL

## 2025-01-19 ENCOUNTER — HOSPITAL ENCOUNTER (EMERGENCY)
Facility: CLINIC | Age: 80
Discharge: HOME | End: 2025-01-19
Attending: EMERGENCY MEDICINE | Admitting: EMERGENCY MEDICINE
Payer: COMMERCIAL

## 2025-01-19 VITALS
WEIGHT: 125 LBS | BODY MASS INDEX: 21.34 KG/M2 | HEART RATE: 62 BPM | HEIGHT: 64 IN | TEMPERATURE: 98 F | DIASTOLIC BLOOD PRESSURE: 79 MMHG | SYSTOLIC BLOOD PRESSURE: 146 MMHG | OXYGEN SATURATION: 97 % | RESPIRATION RATE: 18 BRPM

## 2025-01-19 DIAGNOSIS — J34.89 SINUS PRESSURE: ICD-10-CM

## 2025-01-19 DIAGNOSIS — R03.0 ELEVATED BLOOD PRESSURE READING: ICD-10-CM

## 2025-01-19 LAB
ANION GAP SERPL CALCULATED.3IONS-SCNC: 8 MMOL/L (ref 7–15)
ATRIAL RATE - MUSE: 58 BPM
BUN SERPL-MCNC: 21.2 MG/DL (ref 8–23)
CALCIUM SERPL-MCNC: 10 MG/DL (ref 8.8–10.4)
CHLORIDE SERPL-SCNC: 103 MMOL/L (ref 98–107)
CREAT SERPL-MCNC: 0.82 MG/DL (ref 0.51–0.95)
DIASTOLIC BLOOD PRESSURE - MUSE: NORMAL MMHG
EGFRCR SERPLBLD CKD-EPI 2021: 73 ML/MIN/1.73M2
GLUCOSE SERPL-MCNC: 107 MG/DL (ref 70–99)
HCO3 SERPL-SCNC: 29 MMOL/L (ref 22–29)
INTERPRETATION ECG - MUSE: NORMAL
P AXIS - MUSE: 72 DEGREES
POTASSIUM SERPL-SCNC: 4.3 MMOL/L (ref 3.4–5.3)
PR INTERVAL - MUSE: 164 MS
QRS DURATION - MUSE: 70 MS
QT - MUSE: 406 MS
QTC - MUSE: 398 MS
R AXIS - MUSE: 24 DEGREES
SODIUM SERPL-SCNC: 140 MMOL/L (ref 135–145)
SYSTOLIC BLOOD PRESSURE - MUSE: NORMAL MMHG
T AXIS - MUSE: 65 DEGREES
VENTRICULAR RATE- MUSE: 58 BPM

## 2025-01-19 PROCEDURE — 36415 COLL VENOUS BLD VENIPUNCTURE: CPT | Performed by: EMERGENCY MEDICINE

## 2025-01-19 PROCEDURE — 93005 ELECTROCARDIOGRAM TRACING: CPT

## 2025-01-19 PROCEDURE — 82435 ASSAY OF BLOOD CHLORIDE: CPT | Performed by: EMERGENCY MEDICINE

## 2025-01-19 PROCEDURE — 99284 EMERGENCY DEPT VISIT MOD MDM: CPT

## 2025-01-19 PROCEDURE — 80048 BASIC METABOLIC PNL TOTAL CA: CPT | Performed by: EMERGENCY MEDICINE

## 2025-01-19 ASSESSMENT — ACTIVITIES OF DAILY LIVING (ADL)
ADLS_ACUITY_SCORE: 52

## 2025-01-19 NOTE — DISCHARGE INSTRUCTIONS
Please check your blood pressure 2 times per day and keep a log to discuss with the primary care doctor.  With regards to the sinus pressure, you can try using Afrin 2 times per day for no more than 3 days.  Can also try a short course of ibuprofen along with the Tylenol and Nasacort.  But also recommend using saline flush.

## 2025-01-19 NOTE — ED TRIAGE NOTES
Patient here  with elevated blood pressure 195/100. She stated she is feeling stress . She also c/o a headache. She took tylenol one hour ago     Triage Assessment (Adult)       Row Name 01/19/25 0052          Triage Assessment    Airway WDL WDL        Respiratory WDL    Respiratory WDL WDL        Skin Circulation/Temperature WDL    Skin Circulation/Temperature WDL WDL        Cardiac WDL    Cardiac WDL WDL        Peripheral/Neurovascular WDL    Peripheral Neurovascular WDL WDL        Cognitive/Neuro/Behavioral WDL    Cognitive/Neuro/Behavioral WDL WDL

## 2025-01-19 NOTE — ED PROVIDER NOTES
"  Emergency Department Note      History of Present Illness     Chief Complaint   Hypertension      HPI   Winston Mcleod is a 80 year old female presenting to the ED with hypertension. The patient reports noticing increasing blood pressure since she got back from a trip to [place] on January 9th, stating she was given very stressful news. She had been on 2.5 mg of amlodipine daily and was recently increased to 5 mg by her primary care provider; she took this new dose on Thursday, Friday and on Saturday. Winston took 2 alprazolam and 2 propanolol with some relief. She also the patient reports dry eyes and nasal pressure; she used tylenol and afrin to manages these symptoms last night.     Independent Historian   None    Review of External Notes   I reviewed urgent care note from 1/16/25 discussing elevated blood pressure.     Past Medical History     Medical History and Problem List   C. difficile diarrhea  Gastroesophageal reflux disease  Hypertension  Hypokalemia    Medications   acyclovir   amlopidine  alprazolam   citalopram   clindamycin   cyclobenzaprine   escitalopram   gabapentin  hydroxyzine  loperamide   meclizine  omeprazole  propranolol   simvastatin  valacyclovir  zaleplon  zolpidem     Surgical History   Appendectomy     Physical Exam     Patient Vitals for the past 24 hrs:   BP Temp Pulse Resp SpO2 Height Weight   01/19/25 0145 (!) 146/79 -- 62 -- -- -- --   01/19/25 0048 (!) 178/90 98  F (36.7  C) 75 18 97 % 1.626 m (5' 4\") 56.7 kg (125 lb)     Physical Exam  General: Does not appear in acute distress  Head: No signs of trauma.   Mouth/Throat: Oropharynx is clear and moist.   Eyes: Conjunctivae are normal.   Neck: Normal range of motion. No nuchal rigidity.   CV: Normal rate and regular rhythm.    Resp: Effort normal and breath sounds normal. No respiratory distress.   GI: Soft. There is no tenderness.  No rebound or guarding.  Normal bowel sounds.    MSK: Normal range of motion. no edema. No Calf " tenderness.  Neuro: The patient is alert and oriented to person, place, and time.  PERRLA, EOMI, strength in upper/lower extremities normal and symmetrical. Sensation normal. Speech normal.  CN2-12 intact.  Ambulatory.   Skin: Skin is warm and dry. No rash noted.   Psych: normal mood and affect. behavior is normal.       Diagnostics     Lab Results   Labs Ordered and Resulted from Time of ED Arrival to Time of ED Departure   BASIC METABOLIC PANEL - Abnormal       Result Value    Sodium 140      Potassium 4.3      Chloride 103      Carbon Dioxide (CO2) 29      Anion Gap 8      Urea Nitrogen 21.2      Creatinine 0.82      GFR Estimate 73      Calcium 10.0      Glucose 107 (*)        Imaging   No orders to display       EKG   ECG taken at 0227, ECG read at 0245  Sinus bradycardia   Low voltage QRS  Borderline ECG    No significant changes as compared to prior, dated 05/11/01.  Rate 58 bpm. ND interval 164 ms. QRS duration 70 ms. QT/QTc 406/398 ms. P-R-T axes 72 24 65.    Independent Interpretation   None    ED Course      Medications Administered   Medications - No data to display    Procedures   Procedures     Discussion of Management   None    ED Course   ED Course as of 01/19/25 0508   Sun Jan 19, 2025   0150 I obtained the history and examined the patient as noted above.     0332 I rechecked the patient and explained findings.        Additional Documentation  None    Medical Decision Making / Diagnosis     CMS Diagnoses: None    MIPS       None    [facility]   Winston Mcleod is a 80 year old female presents due to concern for an elevated blood pressure.  She reports that she has been monitoring her blood pressure and that it has been somewhat high at times recently.  She reports that she has been dealing with quite a bit of stress.  She reports that she had received some bad news tonight and when she checked her blood pressure it was high when she came to the hospital.  Upon my evaluation, the patient's blood pressure had  largely normalized without intervention.  She was fully neurologically intact.  Screening EKG and creatinine were obtained that were reassuring.  Patient did report having some facial pain, but this seems to be more of an ongoing issue as opposed to a new acute change.  I do not feel there is any further workup or intervention necessary regarding the elevated blood pressure.  I recommended the patient continue to monitor her blood pressure and keep a log to discuss with her doctor on 1/28 as scheduled.  She did report having some facial pain and reports that it feels like it is her sinuses.  Per the patient and on chart review this has been a recurring issue for the patient.  She has not had any mucus or drainage.  I discussed supportive care measures for possible cellulitis.  I discussed using Afrin 2 times a day for no more than 3 days and doing saline rinses.  I discussed that she could do a short course of ibuprofen to help with pain and inflammation.  Recommend that she follow-up with her doctor again regarding this if she has continued symptoms.    Disposition   The patient was discharged.     Diagnosis     ICD-10-CM    1. Elevated blood pressure reading  R03.0       2. Sinus pressure  J34.89            Discharge Medications   Discharge Medication List as of 1/19/2025  3:55 AM            Scribe Disclosure:  I, Anahi Cintron, am serving as a scribe at 2:13 AM on 1/19/2025 to document services personally performed by Heladio Oshea MD based on my observations and the provider's statements to me.        Heladio Oshea MD  01/19/25 0546

## 2025-03-25 ENCOUNTER — TRANSFERRED RECORDS (OUTPATIENT)
Dept: HEALTH INFORMATION MANAGEMENT | Facility: CLINIC | Age: 80
End: 2025-03-25
Payer: COMMERCIAL

## 2025-04-21 ENCOUNTER — TRANSFERRED RECORDS (OUTPATIENT)
Dept: HEALTH INFORMATION MANAGEMENT | Facility: CLINIC | Age: 80
End: 2025-04-21
Payer: COMMERCIAL

## 2025-05-29 ENCOUNTER — TRANSFERRED RECORDS (OUTPATIENT)
Dept: HEALTH INFORMATION MANAGEMENT | Facility: CLINIC | Age: 80
End: 2025-05-29
Payer: COMMERCIAL

## 2025-06-04 ENCOUNTER — TRANSFERRED RECORDS (OUTPATIENT)
Dept: HEALTH INFORMATION MANAGEMENT | Facility: CLINIC | Age: 80
End: 2025-06-04
Payer: COMMERCIAL

## 2025-07-15 ENCOUNTER — TRANSFERRED RECORDS (OUTPATIENT)
Dept: HEALTH INFORMATION MANAGEMENT | Facility: CLINIC | Age: 80
End: 2025-07-15
Payer: COMMERCIAL

## (undated) DEVICE — ESU HOLDER LAP INST DISP PURPLE LONG 330MM H-PRO-330

## (undated) DEVICE — POUCH TISSUE RETRIEVAL LAP 10MM 2.21" INTRO TRS100SB2

## (undated) DEVICE — Device

## (undated) DEVICE — LINEN TOWEL PACK X5 5464

## (undated) DEVICE — ENDO TROCAR FIRST ENTRY KII FIOS Z-THRD 12X100MM CTF73

## (undated) DEVICE — SU VICRYL 0 UR-6 27" J603H

## (undated) DEVICE — SUCTION IRR STRYKERFLOW II W/TIP 250-070-520

## (undated) DEVICE — BLADE CLIPPER 4406

## (undated) DEVICE — SU VICRYL 4-0 PS-2 18" UND J496H

## (undated) DEVICE — ENDO TROCAR SLEEVE KII Z-THREADED 05X100MM CTS02

## (undated) DEVICE — ENDO TROCAR FIRST ENTRY KII FIOS Z-THRD 05X100MM CTF03

## (undated) DEVICE — DRAIN JACKSON PRATT 15FR ROUND SIL LF JP-2229

## (undated) DEVICE — STPL RELOAD REG TISSUE ECHELON 45 X 3.6MM BLUE GST45B

## (undated) DEVICE — NDL INSUFFLATION 13GA 120MM C2201

## (undated) DEVICE — ENDO SCOPE WARMER LF TM500

## (undated) DEVICE — SOL NACL 0.9% INJ 1000ML BAG 2B1324X

## (undated) DEVICE — GLOVE PROTEXIS W/NEU-THERA 8.0  2D73TE80

## (undated) DEVICE — STPL POWERED ECHELON 45MM PSEE45A

## (undated) DEVICE — ESU GROUND PAD UNIVERSAL W/O CORD

## (undated) DEVICE — DRAIN JACKSON PRATT 07MM FLAT 3/4 PERF

## (undated) DEVICE — SOL WATER IRRIG 1000ML BOTTLE 2F7114

## (undated) DEVICE — PREP CHLORAPREP 26ML TINTED ORANGE  260815

## (undated) DEVICE — GOWN IMPERVIOUS SPECIALTY XLG/XLONG 32474

## (undated) DEVICE — PACK LAP CHOLE SLC15LCFSD

## (undated) DEVICE — NDL 22GA 1.5"

## (undated) RX ORDER — ONDANSETRON 2 MG/ML
INJECTION INTRAMUSCULAR; INTRAVENOUS
Status: DISPENSED
Start: 2020-10-09

## (undated) RX ORDER — PROPOFOL 10 MG/ML
INJECTION, EMULSION INTRAVENOUS
Status: DISPENSED
Start: 2020-10-09

## (undated) RX ORDER — GLYCOPYRROLATE 0.2 MG/ML
INJECTION, SOLUTION INTRAMUSCULAR; INTRAVENOUS
Status: DISPENSED
Start: 2020-10-09

## (undated) RX ORDER — FENTANYL CITRATE 50 UG/ML
INJECTION, SOLUTION INTRAMUSCULAR; INTRAVENOUS
Status: DISPENSED
Start: 2020-10-09

## (undated) RX ORDER — LIDOCAINE HYDROCHLORIDE 20 MG/ML
INJECTION, SOLUTION EPIDURAL; INFILTRATION; INTRACAUDAL; PERINEURAL
Status: DISPENSED
Start: 2020-10-09

## (undated) RX ORDER — NEOSTIGMINE METHYLSULFATE 1 MG/ML
VIAL (ML) INJECTION
Status: DISPENSED
Start: 2020-10-09

## (undated) RX ORDER — HYDROMORPHONE HYDROCHLORIDE 1 MG/ML
INJECTION, SOLUTION INTRAMUSCULAR; INTRAVENOUS; SUBCUTANEOUS
Status: DISPENSED
Start: 2020-10-09